# Patient Record
Sex: FEMALE | Race: AMERICAN INDIAN OR ALASKA NATIVE
[De-identification: names, ages, dates, MRNs, and addresses within clinical notes are randomized per-mention and may not be internally consistent; named-entity substitution may affect disease eponyms.]

---

## 2017-01-04 ENCOUNTER — HOSPITAL ENCOUNTER (EMERGENCY)
Dept: HOSPITAL 5 - ED | Age: 67
Discharge: HOME | End: 2017-01-04
Payer: MEDICARE

## 2017-01-04 DIAGNOSIS — R07.9: ICD-10-CM

## 2017-01-04 DIAGNOSIS — N18.6: ICD-10-CM

## 2017-01-04 DIAGNOSIS — Z21: ICD-10-CM

## 2017-01-04 DIAGNOSIS — K21.9: ICD-10-CM

## 2017-01-04 DIAGNOSIS — I12.0: Primary | ICD-10-CM

## 2017-01-04 DIAGNOSIS — Z90.49: ICD-10-CM

## 2017-01-04 DIAGNOSIS — Z99.2: ICD-10-CM

## 2017-01-04 DIAGNOSIS — R79.89: ICD-10-CM

## 2017-01-04 LAB
ANION GAP SERPL CALC-SCNC: 23 MMOL/L
BASOPHILS NFR BLD AUTO: 1 % (ref 0–1.8)
BUN SERPL-MCNC: 43 MG/DL (ref 7–17)
BUN/CREAT SERPL: 6.14 %
CALCIUM SERPL-MCNC: 9.5 MG/DL (ref 8.4–10.2)
CHLORIDE SERPL-SCNC: 94.1 MMOL/L (ref 98–107)
CHOLEST SERPL-MCNC: 146 MG/DL (ref 50–199)
CK SERPL-CCNC: 42 UNITS/L (ref 30–135)
CK SERPL-CCNC: 43 UNITS/L (ref 30–135)
CK SERPL-CCNC: 47 UNITS/L (ref 30–135)
CO2 SERPL-SCNC: 30 MMOL/L (ref 22–30)
EOSINOPHIL NFR BLD AUTO: 1.9 % (ref 0–4.3)
GLUCOSE SERPL-MCNC: 114 MG/DL (ref 65–100)
HCT VFR BLD CALC: 33.4 % (ref 30.3–42.9)
HDLC SERPL-MCNC: 71 MG/DL (ref 40–59)
HGB BLD-MCNC: 10.2 GM/DL (ref 10.1–14.3)
MCH RBC QN AUTO: 27 PG (ref 28–32)
MCHC RBC AUTO-ENTMCNC: 31 % (ref 30–34)
MCV RBC AUTO: 90 FL (ref 79–97)
PLATELET # BLD: 117 K/MM3 (ref 140–440)
POTASSIUM SERPL-SCNC: 4.6 MMOL/L (ref 3.6–5)
RBC # BLD AUTO: 3.73 M/MM3 (ref 3.65–5.03)
SODIUM SERPL-SCNC: 142 MMOL/L (ref 137–145)
TRIGL SERPL-MCNC: 66 MG/DL (ref 2–149)
WBC # BLD AUTO: 4.6 K/MM3 (ref 4.5–11)

## 2017-01-04 PROCEDURE — 78452 HT MUSCLE IMAGE SPECT MULT: CPT

## 2017-01-04 PROCEDURE — 80061 LIPID PANEL: CPT

## 2017-01-04 PROCEDURE — 36415 COLL VENOUS BLD VENIPUNCTURE: CPT

## 2017-01-04 PROCEDURE — 71020: CPT

## 2017-01-04 PROCEDURE — 85025 COMPLETE CBC W/AUTO DIFF WBC: CPT

## 2017-01-04 PROCEDURE — A9502 TC99M TETROFOSMIN: HCPCS

## 2017-01-04 PROCEDURE — 99284 EMERGENCY DEPT VISIT MOD MDM: CPT

## 2017-01-04 PROCEDURE — 96375 TX/PRO/DX INJ NEW DRUG ADDON: CPT

## 2017-01-04 PROCEDURE — 96374 THER/PROPH/DIAG INJ IV PUSH: CPT

## 2017-01-04 PROCEDURE — 93306 TTE W/DOPPLER COMPLETE: CPT

## 2017-01-04 PROCEDURE — 82553 CREATINE MB FRACTION: CPT

## 2017-01-04 PROCEDURE — 83880 ASSAY OF NATRIURETIC PEPTIDE: CPT

## 2017-01-04 PROCEDURE — 84484 ASSAY OF TROPONIN QUANT: CPT

## 2017-01-04 PROCEDURE — 82550 ASSAY OF CK (CPK): CPT

## 2017-01-04 PROCEDURE — 93005 ELECTROCARDIOGRAM TRACING: CPT

## 2017-01-04 PROCEDURE — 93017 CV STRESS TEST TRACING ONLY: CPT

## 2017-01-04 PROCEDURE — 80048 BASIC METABOLIC PNL TOTAL CA: CPT

## 2017-01-04 PROCEDURE — 93010 ELECTROCARDIOGRAM REPORT: CPT

## 2017-01-04 NOTE — ADMIT CRITERIA FORM
Admission Criteria Documentation: 





                                                CHEST PAIN





Clinical Indications for Admission to Inpatient Care





                                                                         (Place 

'X' for any and all applicable criteria): 





Admission is indicated for chest pain and ANY ONE of the following(1)(2)(3)(4)(5

): 





[ ]I.    Angina with acute coronary syndrome (Also use Myocardial Infarction or 

Angina guideline)


[ ]II.   Hemodynamic instability


[ X]III.  Angina needing acute intervention as indicated by ALL of the following

(11)(12):


        [X ]a)  Unstable angina is present as indicated by angina that is ANY 

ONE of the following:


                [ ]i)     New onset   


                [ ]ii)    Nocturnal   


                [ ]iii)   Prolonged at rest   


                [X ]iv)   Progressive


        [X ]b)  Angina warrants acute intervention as indicated by ANY ONE of 

the following:


                [ ]i)     Recurrent angina (e.g, not responding as previously 

to treatment)


                [ ]ii)     Angina at rest or with low-level activities despite 

initial medical therapy


                [ ]iii)    New or presumably new ST-segment depression on ECG


                [ ]iv)    Signs or symptoms of heart failure (eg, dyspnea, 

pulmonary edema)


                [ ]v)     New or worsening mitral regurgitation


                [ ]vi)    Hemodynamic instability


                [ ]vii)   Dangerous arrhythmia (eg, sustained ventricular 

tachycardia)          


                [ ]viii)   History of percutaneous coronary intervention within 

6 months          


                [ ]ix)    History of coronary artery bypass graft surgery


                [ ]x)    BARR[place] risk score of 2 or greater[A]


                [ ]xi)    History of Diabetes(14)


                [ ]xii)   High-risk cardiac ischemia findings on noninvasive 

testing (e.g, echocardiogram,


                          treadmill testing, nuclear scan)


                [X ]xiii)  Chronic renal insufficiency (ie, estimated GFR less 

than 60 mL/min/1.732m)


                [ ]xiv)  Left ventricular ejection fraction less than 40%


              


[ ]IV.   Evidence of MI (eg, cardiac biomarkers positive, ST-segment elevation 

on ECG) also use Myocardial Infarction Criteria Form.


[ ]V.    Pulmonary edema 


[ ]VI.   Respiratory distress


[ ]VII.  Chest pain indicative of serious diagnosis other than coronary artery 

disease (eg, aortic dissection)





[ ]VIII. Contraindications and/or Inappropriate clinical situations for 

Observational Care in patients


          with Chest Pain, when ANY ONE of the following is required:


          [ ]a)   Patient with risk factor for pulmonary embolism, acute 

coronary syndrome and myocardial infarction (18)


          [ ]b)   Patient with Pulmonary embolism require an average LOS of 4.3 

days, therefore emergency 


                   department observation management is inappropriate 18,23


          [ ]c)   Painful condition/s in the elderly, have the highest rate of 

recidivism after emergency department observation management (10.8%)  20,21,22


          [ ]d)   Elevated cardiac biomarker requires intensive and exhaustive 

care (19)


[xX ]IX.  General contraindications and/or Inappropriate clinical situations 

for Observational Care in patients


          with Chest Pain, when ANY ONE of the following is required:


           [xX]a)   Prediction of prolongation of LOS based on ANY ONE of the 

following may be considered as 


                    a contraindication for observational care 2, 3, 4, 5, 6, 7, 

8, 9, 10, 11


                    [X ]i)    Age > 65 yrs.


                    [ ]ii)   Patient arriving by ambulance


                    [ ]iii)  Patient with high acuity


                    [ ]iv)  Patient requiring vital sign monitoring


                    [ ]v)   Patient on IV medication


           [ ]b)   Systolic blood pressures  180mmHg  3,12


           [ ]c)   Patient with altered mental status including delirium and 

other alteration of consciousness, (3)


           [ ]d)   Patient whose discharge disposition will be to a skilled 

nursing home or rehabilitation home should 


                    not be managed in Emergency Department Observation Unit. 

CMS rule requires 3 days hospital stay before such placement. 3,13


           [ ]e)   Patient with failure to thrive due to broad array of 

etiologies  3,16,17


           [ ]f)    Inability to ambulate  3,14








Extended stay beyond goal length of stay may be needed for (1)(28):


[ ]a)   Specific condition diagnosed after evaluation (eg, pulmonary embolism, 

aortic dissection) 


[ ]b)   Unstable angina


[ ]c)   Continued suspicion of acute coronary syndrome with inability to 

complete needed cardiac evaluation


         (eg, patient clinically unable to undergo stress testing)


[ ]d)   Myocardial infarction








(Contents from ANGINA and CHEST PAIN clinical indications for admission to 

inpatient care have been integrated in this form) 








The original [employer] content created by [employer] has been revised. 


The portions of the content which have been revised are identified through the 

use of italic text or in bold, and [facility]


has neither reviewed nor approved the modified material. All other unmodified 

content is copyright  [facility].





Please see references footnoted in the original [facility] edition 

2016





Admission Criteria Met: Yes

## 2017-01-04 NOTE — DISCHARGE SUMMARY
Providers





- Providers


Date of Admission: 


1/4/17





Date of discharge: 01/04/17 01/04/17


Consult to Cardiac Rehabilitation [CONS] Routine 


   Reason For Exam: Phase I





01/04/17 09:28


Consult to Physician [CONS] Routine 


   Consulting Provider: NÉSTOR MORENO


   Reason For Exam: esrd on hd











Primary care physician: 


NANCY CROOKSPOON








Hospitalization


Reason for admission: CHEST PAIN


Condition: Stable


Hospital course: 


Patient is a 66-year-old female with history of cardiomyopathy EF 40-45%, 

hypertension, end-stage renal disease on her way to dialysis this morning and 

experienced some midsternal chest discomfort.  Although in the ER she stated a 

spell of 4 AM she actually tells me that he was on her way to dialysis which 

was much later than that.  She stated that after having about 4 of belching 

exercise the pain went away.  She did report some shortness of breath but she 

states that this has been going on for months.  On his unchanged.  Although 

with the chest pain she was anxious admitted to appear worse.  She denied any 

palpitation she denied any nausea vomiting or diarrhea she denied any 

diaphoresis she denied any blurry vision or headache.  She rated the pain 8/10 

in intensity she stated at first.  Pressure-like with no aggravating factor but 

alleviated by belching.  She had a stress test in 2015 which was unremarkable.  

She unfortunately has not been following up with cardiology as recommended.  

Patient did come in and have a stress test done which was unremarkable echo was 

unchanged.  She was dialyzed and discharge.  She is to follow-up with 

cardiology she verbalizes understanding.





1.  Atypical chest pain- likely GERD


2.  Acute on chronic dyspnea


3.  Hypertension


4.  HIV


5.  Anemia of chronic disease


6.  Chronically elevated troponin








Disposition: DISCHARGED TO HOME OR SELFCARE


Time spent for discharge: 35 MINS





Core Measure Documentation





- Palliative Care


Palliative Care/ Comfort Measures: Not Applicable





- Core Measures


Any of the following diagnoses?: none





- VTE Discharge Requirements


Deep Vein Thrombosis/Pulmonary Embolism Present on Admission: No





Exam





- Physical Exam


Narrative exam: 


 VITAL SIGNS:  Reviewed.    


GENERAL:  The patient appeared well nourished and normally developed. Vital 

signs as documented.


HEAD:  No signs of head trauma.


EYES:  Pupils are equal.  Extraocular motions intact.  


EARS:  Hearing grossly intact.


MOUTH:  Oropharynx is normal. 


NECK:  No adenopathy, no JVD.   


CHEST:  Chest with clear breath sounds bilaterally.  No wheezes, rales, or 

rhonchi.  


CARDIAC:  Regular rate and rhythm.  S1 and S2, without murmurs, gallops, or 

rubs.


VASCULAR:  No Edema.  Peripheral pulses normal and equal in all extremities.


ABDOMEN:  Soft, without detectable tenderness.  No sign of distention.  No   

rebound or guarding, and no masses palpated.   Bowel Sounds normal.


MUSCULOSKELETAL:  Good range of motion of all major joints. Extremities without 

clubbing, cyanosis or edema.  


NEUROLOGIC EXAM:  Alert and oriented x 3.  No focal sensory or strength 

deficits.   Speech normal.  Follows commands.


PSYCHIATRIC:  Mood normal.


SKIN:  No rash or lesions.














- Constitutional


Vitals: 


 











Temp Pulse Resp BP Pulse Ox


 


 98.3 F   102 H  18   161/82   102 H


 


 01/04/17 06:03  01/04/17 09:10  01/04/17 10:43  01/04/17 09:10  01/04/17 09:10














Plan


Activity: advance as tolerated, fall precautions


Diet: renal


Follow up with: 


MARLENE BARON MD [Staff Physician] - 3-5 Days


NANCY VALENCIA MD [Primary Care Provider] - 3-5 Days

## 2017-01-04 NOTE — ECHOCARDIOGRAPHY REPORT
Transthoracic Echocardiogram

 

Indication:

Chest pain 

BP:           161/82

 

Conclusions

 *1. Mild LV dysfunction, EF 40-45%.

 *2. Severe conc LVH.

 *3. Dilated L and R atria.

 *4. Mild-moderate MR.

 *5. Moderate TR with severe pulm HTN.

 *6. Small-moderate pericardial effusion, no tamponade.

 *7. Linear echo density at the root/ascending aorta suggests possible

prior aortic graft-clinical correlation recommended.

 *8. Transaortic gradient of 37/20 suggests mild AS, or may be due to

prior aortic root surgery.

 

Findings     

Left Ventricle:

The left ventricular chamber size is mildly dilated. Severe concentric

left ventricular hypertrophy is observed. Mild global hypokinesis of the

left ventricle is observed. Global left ventricular systolic function is

mild to moderately decreased.  The estimated ejection fraction is

40-45%.  Abnormal left ventricular diastolic function is observed. 

 

Left Atrium:

The left atrium is moderate to severely dilated.  

 

Right Ventricle:

The right ventricle is mildly dilated.  The right ventricular global

systolic function is mildly reduced. 

 

Right Atrium:

The right atrium is moderately dilated.  The interatrial septum appears

normal.  

 

Aortic Valve:

The aortic valve leaflets are mildly thickened. Mild aortic leaflet

calcification is visualized. There is mild aortic regurgitation.  There

is mild aortic stenosis. The mean gradient of the aortic valve is 20

mmHg.  The peak instantaneous gradient of the aortic valve is 37 mmHg. 

The aortic valve area, by VTI's, is calculated at 1.5 cm2.  

 

Mitral Valve:

The mitral valve leaflets appear myxomatous. There is mitral annular

calcification. The mitral valve leaflets are moderately thickened. There

is mild to moderate mitral regurgitation.  There is no evidence of

mitral stenosis. 

 

Tricuspid Valve:

The tricuspid valve leaflets are normal.  There is moderate tricuspid

regurgitation. The right ventricular systolic pressure is calculated at

72 mmHg.  There is evidence of severe pulmonary hypertension. There is

no tricuspid stenosis. 

 

Pulmonic Valve:

The pulmonic valve appears normal. There is trace pulmonic

regurgitation.  There is no pulmonic stenosis.  

 

Pericardium:

There is a small pericardial effusion. There is a circumferential

pericardial effusion. 

 

Aorta:

There is no dilatation of the aortic root. There is a prosthetic graft.

There is a graft in the ascending aorta. 

 

Venous:

The inferior vena cava is dilated.  There is less than 50% respiratory

change in the inferior vena cava dimension. 

 

Measurements     

Chambers MM

Name                    Value         Normal Range            

Ao root diameter (MM)   3.2 cm        (2 - 3.7)               

LA dimension (AP) MM    5.1 cm        (1.9 - 4)               

LA:Ao ratio (MM)        1.59 ratio    -                        

AV cusp separation (MM) 1.4 cm        (1.5 - 2.6)             

 

Chambers 2D

Name                    Value         Normal Range            

RVIDd (AP) 2D           3.47 cm       (0.9 - 2.6)             

IVSd (2D)               1.74 cm       (0.6 - 1.1)             

LVPWd (2D)              1.75 cm       (0.6 - 1.1)             

IVS:LVPW ratio (2D)     0.99 ratio    -                        

LVIDd (2D)              5.32 cm       (3.7 - 5.6)             

LVIDs (2D)              3.61 cm       (2 - 3.8)               

LV FS (Teichholz) (2D)  32.1 %        -                        

LV FS (cube) (2D)       32.1 %        -                        

LA dimension (AP) 2D    4.8 cm        (1.9 - 4)               

 

Volumes/Mass

Name                    Value         Normal Range            

LA ESV SP 4CH (MOD)     87 ml         -                        

LA ESV SP 2CH (MOD)     95 ml         -                        

LA ESV BP (MOD)         96 ml         -                        

LA ESV BP (MOD) index   58.2 ml/m2    -                        

LV EDV SP 4CH (MOD)     54 ml         -                        

LV ESV SP 4CH (MOD)     20 ml         -                        

EF SP 4CH (MOD)         63 %          -                        

LV EDV SP 2CH (MOD)     96 ml         -                        

LV ESV SP 2CH (MOD)     44 ml         -                        

EF SP 2CH (MOD)         54 %          -                        

 

Diastolic/Systolic Function

Name                    Value         Normal Range            

MV E-wave Vmax          1.8 m/sec     -                        

MV deceleration time    106 msec      -                        

LV septal e' Vmax       0.09 m/sec    -                        

LV lateral e' Vmax      0.09 m/sec    -                        

LV E:e' septal ratio    20.3 ratio    -                        

LV E:e' lateral ratio   19.8 ratio    -                        

 

Aortic Valve

Name                    Value         Normal Range            

AV VTI                  49.1 cm       -                        

AV peak gradient        37 mmHg       -                        

AV mean gradient        20 mmHg       -                        

LVOT diameter           2 cm          -                        

LVOT VTI                25.8 cm       -                        

LVOT mean gradient      5 mmHg        -                        

SV LVOT                 81 ml         -                        

MALENA (continuity VTI)    1.5 cm2       -                        

 

Mitral Valve

Name                    Value         Normal Range            

MV PHT                  48 msec       -                        

MR Vmax                 5.21 m/sec    -                        

MR VTI                  152 cm        -                        

MVA (PHT)               4.58 cm2      -                        

 

Tricuspid Valve

Name                    Value         Normal Range            

TR Vmax                 3.77 m/sec    -                        

TR peak gradient        57 mmHg       -                        

RAP                     15 mmHg       -                        

RVSP                    72 mmHg       -                        

 

Pulmonic Valve/Qp:Qs

Name                    Value         Normal Range            

PV Vmax                 1.67 m/sec    -                        

PV peak gradient        11 mmHg       -                        

ID end-diastolic Vmax   1.83 m/sec    -                        

PV acceleration time    102 msec      -                        

 

Electronically signed by: Anamika Garcia MD on 01/04/2017 19:17:08

## 2017-01-04 NOTE — HISTORY AND PHYSICAL REPORT
History of Present Illness


Date of examination: 01/04/17


Date of admission: 





01/04/17


Chief complaint: 


Chest pain or shortness of breath





History of present illness: 


Patient is a 66-year-old female with history of cardiomyopathy EF 40-45%, 

hypertension, end-stage renal disease on her way to dialysis this morning and 

experienced some midsternal chest discomfort.  Although in the ER she stated a 

spell of 4 AM she actually tells me that he was on her way to dialysis which 

was much later than that.  She stated that after having about 4 of belching 

exercise the pain went away.  She did report some shortness of breath but she 

states that this has been going on for months.  On his unchanged.  Although 

with the chest pain she was anxious admitted to appear worse.  She denied any 

palpitation she denied any nausea vomiting or diarrhea she denied any 

diaphoresis she denied any blurry vision or headache.  She rated the pain 8/10 

in intensity she stated at first.  Pressure-like with no aggravating factor but 

I dictated by belching.  She had a stress test in 2015 which was unremarkable.  

She unfortunately has not been following up with cardiology as recommended.





ROS





Constitutional:  No fever, fatigue or weight loss.  


Skin:  No rash.  


Eyes:  No recent vision problems or eye pain.  


ENT:  No congestion, ear pain, or sore throat.  


Endocrine:  No thyroid problems.  


Cardiovascular:  Chest pain.  


Respiratory:  shortness of breath but No cough, congestion, or wheezing.  


Gastrointestinal:  No abdominal pain, nausea, vomiting, or diarrhea.  


Genitourinary: No dysuria.  


Musculoskeletal:  No joint swelling.  


Neurologic:  No seizures. 


Hematologic:  No unusual bruising or bleeding.  


Psychiatric:  No psychiatric problems, hallucinations or depression.  


All other systems reviewed and otherwise negative.








Past History


Past Medical History: arrhythmia, anemia (eyes pulses in all this weekend he), 

CAD, ESRD, HIV/AIDS, hypertension, other (PUD)


Social history: no significant social history, full code


Family history: no significant family history





Medications and Allergies


 Allergies











Allergy/AdvReac Type Severity Reaction Status Date / Time


 


codeine Allergy  HALLUCINATE Verified 10/19/16 11:09


 


hydralazine Allergy  Vomiting Verified 10/19/16 11:09











 Home Medications











 Medication  Instructions  Recorded  Confirmed  Last Taken  Type


 


Promethazine [Phenergan TAB] 25 mg PO Q6H PRN 12/20/13 12/02/15 12/01/15 History


 


lamiVUDine [Epivir] 0.5 tab PO QHS 12/20/13 12/02/15 12/01/15 History


 


Pantoprazole [Protonix TAB] 40 mg PO BID #60 tablet 01/03/14 12/02/15 12/01/15 

Rx


 


Meclizine [Antivert] 25 mg PO TID PRN #20 tablet 07/21/14 12/02/15 12/01/15 Rx


 


Furosemide 80 mg PO DAILY 04/26/15 12/02/15 12/01/15 History


 


Efavirenz [Sustiva] 600 mg PO QHS  capsule 09/19/15 12/02/15 12/01/15 Rx


 


cloNIDine [Catapres] 0.3 mg PO Q6H  tablet 09/19/15 12/02/15 12/01/15 Rx


 


Labetalol [Normodyne TAB] 300 mg PO TID 30 Days 12/04/15  Unknown Rx


 


NIFEdipine XL [Procardia Xl] 60 mg PO BID #60 tablet 12/04/15  Unknown Rx


 


Terazosin [Hytrin] 5 mg PO BID #60 capsule 12/04/15  Unknown Rx











Active Meds: 


Active Medications





Nitroglycerin (Nitrostat)  0.4 mg SL Q5M PRN


   PRN Reason: Chest Pain


Sodium Chloride (Sodium Chloride Flush Syringe 10 Ml)  10 ml IV PRN PRN


   PRN Reason: LINE FLUSH


   Stop: 01/14/17 09:22











Exam





- Physical Exam


Narrative exam: 


 VITAL SIGNS:  Reviewed.    


GENERAL:  The patient appeared well nourished and normally developed. Vital 

signs as documented.


HEAD:  No signs of head trauma.


EYES:  Pupils are equal.  Extraocular motions intact.  


EARS:  Hearing grossly intact.


MOUTH:  Oropharynx is normal. 


NECK:  No adenopathy, no JVD.   


CHEST:  Chest with clear breath sounds bilaterally.  No wheezes, rales, or 

rhonchi.  


CARDIAC:  Regular rate and rhythm.  S1 and S2, without murmurs, gallops, or 

rubs.


VASCULAR:  No Edema.  Peripheral pulses normal and equal in all extremities.


ABDOMEN:  Soft, without detectable tenderness.  No sign of distention.  No   

rebound or guarding, and no masses palpated.   Bowel Sounds normal.


MUSCULOSKELETAL:  Good range of motion of all major joints. Extremities without 

clubbing, cyanosis or edema.  


NEUROLOGIC EXAM:  Alert and oriented x 3.  No focal sensory or strength 

deficits.   Speech normal.  Follows commands.


PSYCHIATRIC:  Mood normal.


SKIN:  No rash or lesions.














- Constitutional


Vitals: 


 











Temp Pulse Resp BP Pulse Ox


 


 98.3 F   102 H  18   161/82   102 H


 


 01/04/17 06:03  01/04/17 09:10  01/04/17 10:43  01/04/17 09:10  01/04/17 09:10














Results





- Labs


CBC & Chem 7: 


 01/04/17 06:18





 01/04/17 06:18


Labs: 


 Laboratory Last Values











WBC  4.6 K/mm3 (4.5-11.0)   01/04/17  06:18    


 


RBC  3.73 M/mm3 (3.65-5.03)   01/04/17  06:18    


 


Hgb  10.2 gm/dl (10.1-14.3)   01/04/17  06:18    


 


Hct  33.4 % (30.3-42.9)   01/04/17  06:18    


 


MCV  90 fl (79-97)   01/04/17  06:18    


 


MCH  27 pg (28-32)  L  01/04/17  06:18    


 


MCHC  31 % (30-34)   01/04/17  06:18    


 


RDW  21.0 % (13.2-15.2)  H  01/04/17  06:18    


 


Plt Count  117 K/mm3 (140-440)  L  01/04/17  06:18    


 


Lymph % (Auto)  17.7 % (13.4-35.0)   01/04/17  06:18    


 


Mono % (Auto)  7.0 % (0.0-7.3)   01/04/17  06:18    


 


Eos % (Auto)  1.9 % (0.0-4.3)   01/04/17  06:18    


 


Baso % (Auto)  1.0 % (0.0-1.8)   01/04/17  06:18    


 


Lymph #  0.8 K/mm3 (1.2-5.4)  L  01/04/17  06:18    


 


Mono #  0.3 K/mm3 (0.0-0.8)   01/04/17  06:18    


 


Eos #  0.1 K/mm3 (0.0-0.4)   01/04/17  06:18    


 


Baso #  0.0 K/mm3 (0.0-0.1)   01/04/17  06:18    


 


Seg Neutrophils %  72.4 % (40.0-70.0)  H  01/04/17  06:18    


 


Seg Neutrophils #  3.4 K/mm3 (1.8-7.7)   01/04/17  06:18    


 


Sodium  142 mmol/L (137-145)   01/04/17  06:18    


 


Potassium  4.6 mmol/L (3.6-5.0)   01/04/17  06:18    


 


Chloride  94.1 mmol/L ()  L  01/04/17  06:18    


 


Carbon Dioxide  30 mmol/L (22-30)   01/04/17  06:18    


 


Anion Gap  23 mmol/L  01/04/17  06:18    


 


BUN  43 mg/dL (7-17)  H  01/04/17  06:18    


 


Creatinine  7.0 mg/dL (0.7-1.2)  H  01/04/17  06:18    


 


Estimated GFR  7 ml/min  01/04/17  06:18    


 


BUN/Creatinine Ratio  6.14 %  01/04/17  06:18    


 


Glucose  114 mg/dL ()  H  01/04/17  06:18    


 


Calcium  9.5 mg/dL (8.4-10.2)   01/04/17  06:18    


 


Total Creatine Kinase  47 units/L ()   01/04/17  09:46    


 


CK-MB (CK-2)  1.8 ng/mL (0.0-4.0)   01/04/17  09:46    


 


CK-MB (CK-2) Rel Index  3.8  (0-4)   01/04/17  09:46    


 


Troponin T  0.074 ng/mL (0.00-0.029)  H  01/04/17  10:33    


 


NT-Pro-B Natriuret Pep  33709 pg/mL (0-900)  H  01/04/17  09:46    


 


Triglycerides  66 mg/dL (2-149)   01/04/17  06:18    


 


Cholesterol  146 mg/dL ()   01/04/17  06:18    


 


LDL Cholesterol Direct  62 mg/dL ()   01/04/17  06:18    


 


HDL Cholesterol  71 mg/dL (40-59)  H  01/04/17  06:18    


 


Cholesterol/HDL Ratio  2.05 %  01/04/17  06:18    














- Imaging and Cardiology


EKG: image reviewed (normal sinus rhythm impression review)


Chest x-ray: image reviewed (cardiomegaly, personal review)





Assessment and Plan


Assessment and plan: 


Patient is a 66-year-old female with history of cardiomyopathy EF 40-45%, 

hypertension, end-stage renal disease on her way to dialysis this morning and 

experienced some midsternal chest discomfort.  Although in the ER she stated a 

spell of 4 AM she actually tells me that he was on her way to dialysis which 

was much later than that.  She stated that after having about 4 of belching 

exercise the pain went away.  She did report some shortness of breath but she 

states that this has been going on for months.  On his unchanged.  Although 

with the chest pain she was anxious admitted to appear worse.  She denied any 

palpitation she denied any nausea vomiting or diarrhea she denied any 

diaphoresis she denied any blurry vision or headache.  She rated the pain 8/10 

in intensity she stated at first.  Pressure-like with no aggravating factor but 

I dictated by belching.  She had a stress test in 2015 which was unremarkable.  

She unfortunately has not been following up with cardiology as recommended.





1.  Atypical chest pain- likely GERD


2.  Acute on chronic dyspnea


3.  Hypertension


4.  HIV


5.  Anemia of chronic disease


6.  Chronically elevated troponin





Plan


* Considering patient's history and risk factors will obtain stress test this 

morning while continuing home medications stress test is negative we'll also 

check an echocardiogram to assure no change in EF.  I've counseled the patient 

will need to follow up with cardiology appropriately if stress test is negative 

patient can be safely discharged home symptoms have resolved.


* Discussed with Dr. Limon cardiologist.


* Nephrology consultation to assist with dialysis


* DVT and GI prophylaxis


Advance Directives: Yes


Plan of care discussed with patient/family: Yes

## 2017-01-04 NOTE — XRAY REPORT
CHEST 2 VIEWS:



INDICATION: Shortness of breath.



COMPARISON: 10/19/2016



FINDINGS: PA and lateral chest radiographs again demonstrate moderate 

cardiomegaly.  Slight aortic knob microcalcifications.  Well-expanded 

lungs, though slight costophrenic angle blunting again not excluded for 

pleural thickening versus minimal fluid.  No large pleural effusions or 

CHF, however.  Possible osteopenia. 



CONCLUSION: No significant interval change in cardiomegaly and few 

other incidental findings, as above.



Thank you for the opportunity to participate in this patient's care.

## 2017-01-04 NOTE — EMERGENCY DEPARTMENT REPORT
HPI





- General


Chief Complaint: Dyspnea/Respdistress


Time Seen by Provider: 01/04/17 08:35





- HPI


HPI: 





The patient is a 66-year-old female with a history of end-stage renal disease, 

who presents for evaluation of chest pain and shortness of breath.  The patient 

reports sudden onset of midsternal chest pain at 4 AM this morning, 4 hours 

prior to my evaluation.  She states that her chest pain has been constant since 

onset, currently 8 out of 10 in severity, pressure-like in quality, and 

associated with moderate to severe constant shortness of breath.  She has a 

secondary complaint of bilateral lower leg swelling which was present prior to 

chest pain and shortness of breath.  The patient denies fever, syncope, 

hemoptysis, unilateral leg swelling, recent immobilization, history of DVT or PE

, hx cancer.





ED Past Medical Hx





- Past Medical History


Hx Hypertension: Yes


Hx Heart Attack/AMI: No


Hx Congestive Heart Failure: Yes


Hx Diabetes: No


Hx GERD: Yes


Hx Liver Disease: Yes (abnormal liver function test, coagulopathy, mass on liver

)


Hx Renal Disease: Yes (DIALYSIS MWF)


Hx Arthritis: No


Hx Asthma: No


Hx COPD: No


Hx HIV: Yes





- Surgical History


Hx Coronary Stent: No


Hx Open Heart Surgery: No


Hx Pacemaker: No


Hx Internal Defibrillator: No


Hx Cholecystectomy: No


Hx Appendectomy: Yes


Hx Breast Surgery: No


Additional Surgical History: PD catheter.  FISTULA LEFT ARM





- Social History


Smoking Status: Never Smoker


Substance Use Type: None





- Medications


Home Medications: 


 Home Medications











 Medication  Instructions  Recorded  Confirmed  Last Taken  Type


 


Promethazine [Phenergan TAB] 25 mg PO Q6H PRN 12/20/13 12/02/15 12/01/15 History


 


lamiVUDine [Epivir] 0.5 tab PO QHS 12/20/13 12/02/15 12/01/15 History


 


Pantoprazole [Protonix TAB] 40 mg PO BID #60 tablet 01/03/14 12/02/15 12/01/15 

Rx


 


Meclizine [Antivert] 25 mg PO TID PRN #20 tablet 07/21/14 12/02/15 12/01/15 Rx


 


Furosemide 80 mg PO DAILY 04/26/15 12/02/15 12/01/15 History


 


Efavirenz [Sustiva] 600 mg PO QHS  capsule 09/19/15 12/02/15 12/01/15 Rx


 


cloNIDine [Catapres] 0.3 mg PO Q6H  tablet 09/19/15 12/02/15 12/01/15 Rx


 


Labetalol [Normodyne TAB] 300 mg PO TID 30 Days 12/04/15  Unknown Rx


 


NIFEdipine XL [Procardia Xl] 60 mg PO BID #60 tablet 12/04/15  Unknown Rx


 


Terazosin [Hytrin] 5 mg PO BID #60 capsule 12/04/15  Unknown Rx














ED Review of Systems


ROS: 


Stated complaint: SOB/CHEST PAIN/SWOLLEN ANKLES


Other details as noted in HPI








Constitutional: denies: fever


ENT: denies: throat or neck pain


Respiratory: denies: cough, report shortness of breath


Cardiovascular: reports chest pain


Endocrine: denies unexplained weight loss or gain


Gastrointestinal: denies: abdominal pain, nausea


Genitourinary: denies: dysuria


Musculoskeletal: reports leg swelling


Skin: denies: rash


Neurological: denies: headache


Hematological/Lymphatic: denies: easy bleeding or easy bruising  


Psych: denies sadness or hopelessness








Physical Exam





- Physical Exam


Vital Signs: 


 Vital Signs











  01/04/17 01/04/17 01/04/17





  06:03 09:05 09:10


 


Temperature 98.3 F  


 


Pulse Rate 112 H  102 H


 


Respiratory 20 16 18





Rate   


 


Blood Pressure 169/88  


 


Blood Pressure   161/82





[Left]   


 


O2 Sat by Pulse 100 97 102 H





Oximetry   











Physical Exam: 








General: well-nourished, well-developed, no acute distress


Head: Normocephalic, atraumatic


Eyes: normal sclera


ENT: Mucous membranes are pink and moist 


Neck: trachea midline, neck supple, No neck stiffness, no cervical adenopathy


Respiratory: Breath sounds equal bilaterally, no wheezing, rales, or rhonchi


Cardio: S1 and S2 present, no murmurs, rubs, gallops, capillary refill is brisk


Abdomen: Normoactive bowel sounds, soft abdomen, no rigidity, no guarding or 

rebound tenderness


Chest WALL/Back: No tenderness to palpation of the chest wall, no CVA 

tenderness with percussion


Musc: 1+ bilateral leg pitting edema


Skin: No rash


Neuro: no facial drooping, normal speech


Psych: Normal affect








ED Course


 Vital Signs











  01/04/17 01/04/17 01/04/17





  06:03 09:05 09:10


 


Temperature 98.3 F  


 


Pulse Rate 112 H  102 H


 


Respiratory 20 16 18





Rate   


 


Blood Pressure 169/88  


 


Blood Pressure   161/82





[Left]   


 


O2 Sat by Pulse 100 97 102 H





Oximetry   














ED Medical Decision Making





- Lab Data


Result diagrams: 


 01/04/17 06:18





 01/04/17 06:18





- Medical Decision Making





The patient was seen and examined by myself.  The patient is placed on a 

cardiac monitor and continuous pulse ox. On initial evaluation, the patient was 

found to be in no distress. Evaluation orders were placed.  EKG was negative 

for ST elevation or other findings suggestive of acute cardiac infarct.  The 

patient is given IV fentanyl for pain.  X-ray of the chest reveals cardio 

megaly and otherwise is grossly unchanged from previous x-ray.  The patient 

given IV Lasix for leg swelling.  Lab results reveal elevated troponin of 0.06, 

and elevated creatinine of 7, and otherwise labs are unrevealing. The on-call 

hospitalist service was contacted.  They agreed to admit the patient for 

further treatment and close monitoring.  The ED admit order was placed.  The 

patient was admitted in guarded condition.





Critical care attestation.: 


If time is entered above; I have spent that time in minutes in the direct care 

of this critically ill patient, excluding procedure time.








ED Disposition


Clinical Impression: 


 Acute chest pain, ESRD (end stage renal disease), Elevated troponin I level


Disposition: OP ADMITTED AS IP TO THIS HOSP


Is pt being admited?: Yes


Does the pt Need Aspirin: Yes


Condition: Stable


Instructions:  Chest Pain (ED)


Referrals: 


NANCY VALENCIA MD [Primary Care Provider] - 3-5 Days


Time of Disposition: 08:58

## 2017-01-05 VITALS — DIASTOLIC BLOOD PRESSURE: 89 MMHG | SYSTOLIC BLOOD PRESSURE: 216 MMHG

## 2017-01-05 NOTE — TREADMILL REPORT
ORDERING PHYSICIAN:  Gianfranco Jeffries M.D.



FINDINGS:  The left ventricular cavity is dilated.  The left ventricular

ejection fraction is measured at 34% with global hypokinesis noted on gated

imaging.  There is no scintigraphic evidence of myocardial ischemia.  There is a

nonspecific decrease in uptake noted in the anterior lateral and inferior wall.



CONCLUSION:

1.  No scintigraphic evidence of myocardial ischemia.

2.  Nonspecific fixed defect noted in the anterior lateral and inferior wall

that is likely due to the technical limitations or artifact.

3.  Dilated and hypokinetic left ventricle with an ejection fraction measuring

34%.





DD: 01/04/2017 12:28

DT: 01/05/2017 00:24

JOB# [id number]  [id number]

FRANCA/BIGG

## 2017-02-02 ENCOUNTER — HOSPITAL ENCOUNTER (EMERGENCY)
Dept: HOSPITAL 5 - ED | Age: 67
Discharge: HOME | End: 2017-02-02
Payer: MEDICARE

## 2017-02-02 VITALS — DIASTOLIC BLOOD PRESSURE: 156 MMHG | SYSTOLIC BLOOD PRESSURE: 240 MMHG

## 2017-02-02 DIAGNOSIS — Z21: ICD-10-CM

## 2017-02-02 DIAGNOSIS — I12.0: Primary | ICD-10-CM

## 2017-02-02 DIAGNOSIS — K21.9: ICD-10-CM

## 2017-02-02 DIAGNOSIS — Z99.2: ICD-10-CM

## 2017-02-02 DIAGNOSIS — R07.9: ICD-10-CM

## 2017-02-02 DIAGNOSIS — N18.6: ICD-10-CM

## 2017-02-02 DIAGNOSIS — Z87.891: ICD-10-CM

## 2017-02-02 DIAGNOSIS — I50.9: ICD-10-CM

## 2017-02-02 LAB
ALBUMIN SERPL-MCNC: 3.6 G/DL (ref 3.9–5)
ALBUMIN/GLOB SERPL: 1 %
ALP SERPL-CCNC: 83 UNITS/L (ref 35–129)
ALT SERPL-CCNC: 7 UNITS/L (ref 7–56)
ANION GAP SERPL CALC-SCNC: 20 MMOL/L
APTT BLD: 37.8 SEC. (ref 24.2–36.6)
BASOPHILS NFR BLD AUTO: 1.2 % (ref 0–1.8)
BILIRUB DIRECT SERPL-MCNC: < 0.2 MG/DL (ref 0–0.2)
BILIRUB INDIRECT SERPL-MCNC: 0.3 MG/DL
BILIRUB SERPL-MCNC: 0.5 MG/DL (ref 0.1–1.2)
BUN SERPL-MCNC: 21 MG/DL (ref 7–17)
BUN/CREAT SERPL: 3.88 %
CALCIUM SERPL-MCNC: 9.2 MG/DL (ref 8.4–10.2)
CHLORIDE SERPL-SCNC: 92.2 MMOL/L (ref 98–107)
CHOLEST SERPL-MCNC: 131 MG/DL (ref 50–199)
CO2 SERPL-SCNC: 35 MMOL/L (ref 22–30)
EOSINOPHIL NFR BLD AUTO: 4.1 % (ref 0–4.3)
GLUCOSE SERPL-MCNC: 101 MG/DL (ref 65–100)
HCT VFR BLD CALC: 30 % (ref 30.3–42.9)
HDLC SERPL-MCNC: 55 MG/DL (ref 40–59)
HGB BLD-MCNC: 9.3 GM/DL (ref 10.1–14.3)
INR PPP: 1.56 (ref 0.87–1.13)
MCH RBC QN AUTO: 26 PG (ref 28–32)
MCHC RBC AUTO-ENTMCNC: 31 % (ref 30–34)
MCV RBC AUTO: 85 FL (ref 79–97)
PLATELET # BLD: 142 K/MM3 (ref 140–440)
POTASSIUM SERPL-SCNC: 3.8 MMOL/L (ref 3.6–5)
PROT SERPL-MCNC: 7.3 G/DL (ref 6.3–8.2)
RBC # BLD AUTO: 3.53 M/MM3 (ref 3.65–5.03)
SODIUM SERPL-SCNC: 143 MMOL/L (ref 137–145)
TRIGL SERPL-MCNC: 80 MG/DL (ref 2–149)
WBC # BLD AUTO: 7.4 K/MM3 (ref 4.5–11)

## 2017-02-02 PROCEDURE — 36415 COLL VENOUS BLD VENIPUNCTURE: CPT

## 2017-02-02 PROCEDURE — 85025 COMPLETE CBC W/AUTO DIFF WBC: CPT

## 2017-02-02 PROCEDURE — 85610 PROTHROMBIN TIME: CPT

## 2017-02-02 PROCEDURE — 80061 LIPID PANEL: CPT

## 2017-02-02 PROCEDURE — 71010: CPT

## 2017-02-02 PROCEDURE — 80048 BASIC METABOLIC PNL TOTAL CA: CPT

## 2017-02-02 PROCEDURE — 80074 ACUTE HEPATITIS PANEL: CPT

## 2017-02-02 PROCEDURE — 96374 THER/PROPH/DIAG INJ IV PUSH: CPT

## 2017-02-02 PROCEDURE — 93005 ELECTROCARDIOGRAM TRACING: CPT

## 2017-02-02 PROCEDURE — 85730 THROMBOPLASTIN TIME PARTIAL: CPT

## 2017-02-02 PROCEDURE — 96376 TX/PRO/DX INJ SAME DRUG ADON: CPT

## 2017-02-02 PROCEDURE — 99285 EMERGENCY DEPT VISIT HI MDM: CPT

## 2017-02-02 PROCEDURE — 84484 ASSAY OF TROPONIN QUANT: CPT

## 2017-02-02 PROCEDURE — 93010 ELECTROCARDIOGRAM REPORT: CPT

## 2017-02-02 PROCEDURE — 96375 TX/PRO/DX INJ NEW DRUG ADDON: CPT

## 2017-02-02 NOTE — ADMIT CRITERIA FORM
Admission Criteria Documentation: 





                                                   HYPERTENSION





Clinical Indications for Admission to Inpatient Care


                                                    


                                                                              (

Place "X" for any and all applicable criteria):                    


                                                                       


Admission is indicated for ANY ONE of the following(1)(2)(3)(4):


[ ]I.   Hypertensive emergency, with evidence of acute and progressing target 

organ disease as indicated 


        by ANY ONE of the following:


        [ ]a)          Hypertensive encephalopathy (eg, confusion, altered 

mental status)


        [ ]b)          Cerebral infarction


        [ ]c)   Intracranial hemorrhage


        [ ]d)   Myocardial ischemia or infarction


        [ ]e)   Pulmonary edema


        [ ]f)   Aortic dissection


        [ ]g)   Seizure


        [ ]h)   Acute renal insufficiency


        [ ]i)   Papilledema


        [ ]j)   Microangiopathic hemolytic anemia


[ ]II.  Adrenergic crisis (eg, severe hypertension due to pheochromocytoma 

crisis, cocaine or amphetamine


        intoxication, or clonidine withdrawal)


[ X]III.  Severe hypertension (SBP greater than 180 mmHg or DBP greater than 

110 mmHg or greater than


        the 95th percentile for age, gender, and height in pediatric patients)  

that cannot be controlled


        (eg, to SBP less than 160 mmHg and DBP less than 100 mmHg in adults) by 

treatment with oral 


        medication in emergency department or  observation care











Extended stay beyond goal length of stay may be needed for(11)(12)(13):


[ ]a)    Persistent hypertensive encephalopathy


[ ]b)    Continuation of pulmonary edema


[ ]c)    Recurring or persistent severe hypertension


[ ]d)    Target organ damage (eg, angina, stroke, aortic dissection)


[ ]e)    Associated renal insufficiency 








The original [employer] content created by [employer] has been revised. 


The portions of the content which have been revised are identified through the 

use of italic text or in bold, and [facility] 


has neither reviewed nor approved the modified material. All other unmodified 

content is copyright  [employer].





Please see references footnoted in the original [employer] edition 

2016

## 2017-02-02 NOTE — EMERGENCY DEPARTMENT REPORT
HPI





- General


Chief Complaint: Chest Pain


Time Seen by Provider: 02/02/17 10:52





- HPI


HPI: 





The patient is a 66-year-old female presents for evaluation of chest pain.  The 

patient reports chest pain since yesterday at noon, nearly 24 hours ago, left-

sided in location, sharp in quality, elicited with coughing, radiating to the 

left axilla, exacerbated with movement of the left chest or left arm, and 

relieved by rest.  She also reports associated worsening of chronic shortness 

of breath, also for the past 2-3 days, increased with exertion, and worsened 

with coughing.  The patient denies fever, syncope, hemoptysis, unilateral leg 

swelling, recent immobilization, history of DVT or PE, hx cancer.





ED Past Medical Hx





- Past Medical History


Hx Hypertension: Yes


Hx Heart Attack/AMI: No


Hx Congestive Heart Failure: Yes


Hx Diabetes: No


Hx GERD: Yes


Hx Liver Disease: Yes (abnormal liver function test, coagulopathy, mass on liver

)


Hx Renal Disease: Yes (DIALYSIS MWF)


Hx Arthritis: No


Hx Asthma: No


Hx COPD: No


Hx HIV: Yes





- Surgical History


Hx Coronary Stent: No


Hx Open Heart Surgery: No


Hx Pacemaker: No


Hx Internal Defibrillator: No


Hx Cholecystectomy: No


Hx Appendectomy: Yes


Hx Breast Surgery: No


Additional Surgical History: PD catheter.  FISTULA LEFT ARM





- Social History


Smoking Status: Former Smoker


Substance Use Type: None





- Medications


Home Medications: 


 Home Medications











 Medication  Instructions  Recorded  Confirmed  Last Taken  Type


 


lamiVUDine [Epivir] 0.5 tab PO QHS 12/20/13 02/02/17 02/02/17 History


 


Meclizine [Antivert] 25 mg PO TID PRN #20 tablet 07/21/14 02/02/17 02/02/17 Rx


 


Efavirenz [Sustiva] 600 mg PO QHS  capsule 09/19/15 02/02/17 02/01/17 Rx


 


NIFEdipine XL [Procardia Xl] 60 mg PO BID #60 tablet 12/04/15 02/02/17 02/02/17 

Rx


 


Aspirin [Aspirin BABY CHEW TAB] 162 mg PO QDAY 02/02/17 02/02/17 02/01/17 

History


 


Cyclobenzaprine HCl [Flexeril 5 MG 5 mg PO Q8HR PRN #10 tab 02/02/17  Unknown Rx





TAB]     


 


Efavirenz [Sustiva] 600 mg PO DAILY 02/02/17 02/02/17 02/01/17 History


 


Labetalol [Normodyne TAB] 200 mg PO BID 02/02/17 02/02/17 02/01/17 History


 


Minoxidil [Loniten] 10 mg PO QDAY 02/02/17 02/02/17 02/01/17 History


 


Multivitamin Tab [Multiple Vitamin 1 each PO QDAY 02/02/17 02/02/17 02/01/17 

History





TAB (Theragran)]     


 


Terazosin [Hytrin] 10 mg PO BID 02/02/17 02/02/17 02/01/17 History


 


cloNIDine [Catapres] 0.2 mg PO QID 02/02/17 02/02/17 02/01/17 History














ED Review of Systems


ROS: 


Stated complaint: MINOR CHEST PAINS/SOB/WEAKNESS


Other details as noted in HPI








Constitutional: denies: fever


ENT: denies: throat or neck pain


Respiratory: reports cough, shortness of breath


Cardiovascular: reports chest pain


Endocrine: denies unexplained weight loss or gain


Gastrointestinal: denies: abdominal pain, nausea


Genitourinary: denies: dysuria


Musculoskeletal: denies: leg swelling


Skin: denies: rash


Neurological: denies: headache


Hematological/Lymphatic: denies: easy bleeding or easy bruising  


Psych: denies sadness or hopelessness








Physical Exam





- Physical Exam


Vital Signs: 


 Vital Signs











  02/02/17 02/02/17





  09:25 10:37


 


Temperature 98.9 F 


 


Pulse Rate 88 76


 


Respiratory 20 19





Rate  


 


Blood Pressure 212/109 


 


O2 Sat by Pulse 94 100





Oximetry  











Physical Exam: 








General: well-nourished, well-developed, no acute distress


Head: Normocephalic, atraumatic


Eyes: normal sclera


ENT: Mucous membranes are pink and moist 


Neck: trachea midline, neck supple, No neck stiffness, no cervical adenopathy


Respiratory: Breath sounds equal bilaterally, no wheezing, rales, or rhonchi


Cardio: S1 and S2 present, no murmurs, rubs, gallops, capillary refill is brisk


Abdomen: Normoactive bowel sounds, soft abdomen, no rigidity, no guarding or 

rebound tenderness


Chest WALL/Back: tenderness to palpation of the left lower chest wall present, 

no CVA tenderness with percussion


Musc: No pitting edema


Skin: No rash


Neuro: no facial drooping, normal speech


Psych: Normal affect








ED Course


 Vital Signs











  02/02/17 02/02/17





  09:25 10:37


 


Temperature 98.9 F 


 


Pulse Rate 88 76


 


Respiratory 20 19





Rate  


 


Blood Pressure 212/109 


 


O2 Sat by Pulse 94 100





Oximetry  














ED Medical Decision Making





- Lab Data


Result diagrams: 


 02/02/17 09:56





 02/02/17 09:56





- Medical Decision Making





The patient was seen and examined by myself.  The patient is placed on a 

cardiac monitor and continuous pulse ox. On initial evaluation, the patient was 

found to be in no distress.  EKG was negative for findings suggestive of acute 

cardiac infarct. Labs and imaging are obtained.  The patient is given a tablet 

of nitroglycerin, and IV fentanyl for pain, and IV labetalol for elevated blood 

pressure. Chest x-ray is negative for pneumothorax, focal consolidation, 

pulmonary vascular congestion, pleural effusion, or other obvious acute 

cardiopulmonary disease process.  Lab results revealed mildly elevated level of 

troponin, .08, and elevated creatinine of 5, consistent with known history of 

end-stage renal disease and chronically mildly elevated troponin, and otherwise 

labs were non-concerning including levels WBC, hemoglobin, hematocrit, 

electrolytes, renal function.





Medical records are reviewed and revealed that the patient received a stress 

test within the past 30 days which was unremarkable. The patient was 

reevaluated and reported that their symptoms were improved, although blood 

pressure remained increased.  The patient is given a second IV dose of 

labetalol. On reexamination the patient's blood pressure was found to decrease 

outside of range concerning for hypertensive emergency. The patient is stable 

for discharge with outpatient follow-up.  The patient is given follow-up and 

return instructions.  The patient expressed understanding and agreed with the 

plan.  The patient is discharged in stable condition.


Critical care attestation.: 


If time is entered above; I have spent that time in minutes in the direct care 

of this critically ill patient, excluding procedure time.








ED Disposition


Clinical Impression: 


 ESRD (end stage renal disease), Hypertensive urgency, Acute chest pain





Disposition: DISCHARGED TO HOME OR SELFCARE


Is pt being admited?: No


Does the pt Need Aspirin: No


Condition: Stable


Instructions:  Chest Pain (ED)


Referrals: 


ADAM CUMMINS MD [Primary Care Provider] - 3-5 Days


Time of Disposition: 11:28

## 2017-02-02 NOTE — XRAY REPORT
PORTABLE CHEST



INDICATION: Chest pain.



COMPARISON: 1/4/2017



FINDINGS: Portable, frontal chest radiograph now demonstrates increased 

interstitial congestive prominence/markings and small possible pleural 

effusions.  Mild to moderate cardiomegaly again noted as also slight 

aortic knob calcifications.  EKG leads.  Stable bones.



CONCLUSION: New mild CHF and stable cardiomegaly, as described.  Please 

correlate.



Thank you for the opportunity to participate in this patient's care.

## 2017-07-17 ENCOUNTER — HOSPITAL ENCOUNTER (INPATIENT)
Dept: HOSPITAL 5 - ED | Age: 67
LOS: 1 days | Discharge: HOME | DRG: 377 | End: 2017-07-18
Attending: INTERNAL MEDICINE | Admitting: INTERNAL MEDICINE
Payer: MEDICARE

## 2017-07-17 DIAGNOSIS — Z99.2: ICD-10-CM

## 2017-07-17 DIAGNOSIS — K21.9: ICD-10-CM

## 2017-07-17 DIAGNOSIS — N18.6: ICD-10-CM

## 2017-07-17 DIAGNOSIS — Z88.8: ICD-10-CM

## 2017-07-17 DIAGNOSIS — I16.0: ICD-10-CM

## 2017-07-17 DIAGNOSIS — D62: ICD-10-CM

## 2017-07-17 DIAGNOSIS — Z88.5: ICD-10-CM

## 2017-07-17 DIAGNOSIS — Z79.82: ICD-10-CM

## 2017-07-17 DIAGNOSIS — K92.2: Primary | ICD-10-CM

## 2017-07-17 DIAGNOSIS — Z82.49: ICD-10-CM

## 2017-07-17 DIAGNOSIS — K64.9: ICD-10-CM

## 2017-07-17 DIAGNOSIS — Z90.49: ICD-10-CM

## 2017-07-17 DIAGNOSIS — I13.2: ICD-10-CM

## 2017-07-17 DIAGNOSIS — B20: ICD-10-CM

## 2017-07-17 DIAGNOSIS — R00.0: ICD-10-CM

## 2017-07-17 LAB
ALBUMIN SERPL-MCNC: 3.8 G/DL (ref 3.9–5)
ALBUMIN/GLOB SERPL: 1.1 %
ALP SERPL-CCNC: 129 UNITS/L (ref 35–129)
ALT SERPL-CCNC: 13 UNITS/L (ref 7–56)
ANION GAP SERPL CALC-SCNC: 18 MMOL/L
APTT BLD: 30.9 SEC. (ref 24.2–36.6)
BASOPHILS NFR BLD AUTO: 1.3 % (ref 0–1.8)
BILIRUB SERPL-MCNC: 0.2 MG/DL (ref 0.1–1.2)
BUN SERPL-MCNC: 19 MG/DL (ref 7–17)
BUN/CREAT SERPL: 4.63 %
CALCIUM SERPL-MCNC: 9.7 MG/DL (ref 8.4–10.2)
CHLORIDE SERPL-SCNC: 91.2 MMOL/L (ref 98–107)
CO2 SERPL-SCNC: 30 MMOL/L (ref 22–30)
EOSINOPHIL NFR BLD AUTO: 1.9 % (ref 0–4.3)
GLUCOSE SERPL-MCNC: 122 MG/DL (ref 65–100)
HCT VFR BLD CALC: 34.6 % (ref 30.3–42.9)
HGB BLD-MCNC: 10.8 GM/DL (ref 10.1–14.3)
INR PPP: 1.23 (ref 0.87–1.13)
LIPASE SERPL-CCNC: 41 UNITS/L (ref 13–60)
MCH RBC QN AUTO: 27 PG (ref 28–32)
MCHC RBC AUTO-ENTMCNC: 31 % (ref 30–34)
MCV RBC AUTO: 86 FL (ref 79–97)
PLATELET # BLD: 120 K/MM3 (ref 140–440)
POTASSIUM SERPL-SCNC: 3.8 MMOL/L (ref 3.6–5)
PROT SERPL-MCNC: 7.4 G/DL (ref 6.3–8.2)
RBC # BLD AUTO: 4 M/MM3 (ref 3.65–5.03)
SODIUM SERPL-SCNC: 135 MMOL/L (ref 137–145)
WBC # BLD AUTO: 4.8 K/MM3 (ref 4.5–11)

## 2017-07-17 PROCEDURE — 86900 BLOOD TYPING SEROLOGIC ABO: CPT

## 2017-07-17 PROCEDURE — 83690 ASSAY OF LIPASE: CPT

## 2017-07-17 PROCEDURE — 85025 COMPLETE CBC W/AUTO DIFF WBC: CPT

## 2017-07-17 PROCEDURE — 82270 OCCULT BLOOD FECES: CPT

## 2017-07-17 PROCEDURE — 93010 ELECTROCARDIOGRAM REPORT: CPT

## 2017-07-17 PROCEDURE — 96361 HYDRATE IV INFUSION ADD-ON: CPT

## 2017-07-17 PROCEDURE — 99291 CRITICAL CARE FIRST HOUR: CPT

## 2017-07-17 PROCEDURE — 85730 THROMBOPLASTIN TIME PARTIAL: CPT

## 2017-07-17 PROCEDURE — 36415 COLL VENOUS BLD VENIPUNCTURE: CPT

## 2017-07-17 PROCEDURE — 80053 COMPREHEN METABOLIC PANEL: CPT

## 2017-07-17 PROCEDURE — 86901 BLOOD TYPING SEROLOGIC RH(D): CPT

## 2017-07-17 PROCEDURE — 85610 PROTHROMBIN TIME: CPT

## 2017-07-17 PROCEDURE — 93005 ELECTROCARDIOGRAM TRACING: CPT

## 2017-07-17 PROCEDURE — 96374 THER/PROPH/DIAG INJ IV PUSH: CPT

## 2017-07-17 PROCEDURE — 86850 RBC ANTIBODY SCREEN: CPT

## 2017-07-17 PROCEDURE — 80048 BASIC METABOLIC PNL TOTAL CA: CPT

## 2017-07-17 RX ADMIN — PRAZOSIN HYDROCHLORIDE SCH MG: 5 CAPSULE ORAL at 23:51

## 2017-07-17 NOTE — HISTORY AND PHYSICAL REPORT
History of Present Illness


Date of examination: 07/17/17


Date of admission: 


07/17/17 20:55





Chief complaint: 





Bright red blood per rectum


History of present illness: 





66-year-old past medical history of constipation for which she used to take 

stool softeners who presents with bright red blood per rectum, Past medical 

history also includes HIV, end-stage renal disease, hypertension.  She states 

that she had been constipated for a few days.  She has a stream and straining 

to get the stool out and after she strained, she felt that she had to either 

break the stool heart rate, push it out.  She felt something, loose when she 

pushed out a bowel movement after which she started having profuse bleeding, 

she had just come home from dialysis when she went to go use the bathroom she 

states that she had lots of bright red blood in the toilet so one small clots.  

The bleeding seems to have stopped but it scared her prompting her to come to 

the hospitalist denies chest dizziness he denies shortness of breath.  Denies 

previous episodes of bleeding per rectum but admits to having constipation and 

admits to having hemorrhoids but has not had this amount of bleeding in the 

past.  She also states that she tends to bleed easily after dialysis today as 

she gets heparin on dialysis day, for example she states she had a dental 

procedure after dialysis and ended up having a lot of bleeding in her gums.





She is also complaining of pain in her lower abdomen which he describes as dull

, 6 out of 10, the pain started right around the same time as the bleeding.





Past History


Past Medical History: dialysis, ESRD (follows with Dr. Pimentel), HIV/AIDS (

follows with Dr. leal), hypertension


Past Surgical History: appendectomy, Other (AV grafts)


Social history: no significant social history


Family history: hypertension, other (MI)





Medications and Allergies


 Allergies











Allergy/AdvReac Type Severity Reaction Status Date / Time


 


codeine Allergy  HALLUCINATE Verified 05/12/17 09:56


 


hydralazine AdvReac  Vomiting Verified 05/12/17 09:56











 Home Medications











 Medication  Instructions  Recorded  Confirmed  Last Taken  Type


 


Aspirin [Aspirin BABY CHEW TAB] 162 mg PO QDAY 02/02/17 07/17/17 05/10/17 

History


 


Efavirenz [Sustiva] 600 mg PO DAILY 02/02/17 07/17/17 05/10/17 History


 


Minoxidil [Loniten] 10 mg PO QDAY 02/02/17 07/17/17 05/10/17 History


 


Terazosin [Hytrin] 10 mg PO BID 02/02/17 07/17/17 05/10/17 History


 


cloNIDine [Catapres] 0.2 mg PO QID 02/02/17 07/17/17 05/10/17 History


 


Sulfamethoxazole/Trimethoprim 0.5 each PO Q24H #30 tablet 04/10/17 07/17/17 05/

10/17 Rx





[Bactrim DS TAB]     


 


Lamivudine 1 tab PO QDAY 07/17/17 07/17/17 Unknown History


 


Tenofovir [Viread] 300 mg PO QWEEK 07/17/17 07/17/17 Unknown History


 


Vit B Cplx #11/FA/C/Biot/Zn Ox 1 tab PO QDAY 07/17/17 07/17/17 Unknown History





[Dialyvite with Zinc Tablet]     











Active Meds: 


Active Medications





Acetaminophen (Tylenol)  650 mg PO Q4H PRN


   PRN Reason: Pain MILD(1-3)/Fever >100.5/HA


Bisacodyl (Dulcolax)  10 mg TN QDAY PRN


   PRN Reason: Constipation unrelieved by MOM


Clonidine HCl (Catapres)  0.2 mg PO QID JEFF


Hydralazine HCl (Apresoline)  10 mg IV Q4HR PRN


   PRN Reason: BP >160/100


Magnesium Hydroxide (Milk Of Magnesia)  30 ml PO Q4H PRN


   PRN Reason: Constipation


Minoxidil (Loniten)  10 mg PO QDAY [facility]


Miscellaneous Medication (Efavirenz [Sustiva])  600 mg PO DAILY [facility]


Miscellaneous Medication (Lamivudine)  1 tab PO QDAY JEFF


Miscellaneous Medication (Terazosin)  10 mg PO BID JEFF


Miscellaneous Medication (Vit B Cplx #11/Fa/C/Biot/Zn Ox [Dialyvite With Zinc 

Tablet])  1 tab PO QDAY JEFF


Morphine Sulfate (Morphine)  2 mg IV Q4H PRN


   PRN Reason: Pain, Moderate (4-6)


Ondansetron HCl (Zofran)  4 mg IV Q8H PRN


   PRN Reason: N/V unrelieved by Reglan


Oxycodone/Acetaminophen (Percocet 5/325)  1 tab PO Q6H PRN


   PRN Reason: Pain, Moderate (4-6)


Tenofovir Disoproxil Fumarate (Viread)  300 mg PO QWEEK JEFF


Trimethoprim/Sulfamethoxazole (Bactrim Ds)  0.5 each PO Q24H JEFF











Review of Systems


All systems: negative (as stated in HPI, 14 point review of systems otherwise 

negative)





Exam





- Constitutional


Vitals: 


 











Temp Pulse Resp BP Pulse Ox


 


 98.9 F   72   20   186/84   95 


 


 07/17/17 22:21  07/17/17 22:21  07/17/17 22:21  07/17/17 22:21  07/17/17 22:21











General appearance: Present: no acute distress, well-nourished





- EENT


Eyes: Present: PERRL


ENT: hearing intact, clear oral mucosa





- Neck


Neck: Present: supple, normal ROM





- Respiratory


Respiratory effort: normal


Respiratory: bilateral: CTA





- Cardiovascular


Heart Sounds: Present: S1 & S2.  Absent: rub, click





- Extremities


Extremities: pulses symmetrical, No edema (AV graft in left upper extremity)


Peripheral Pulses: within normal limits





- Abdominal


General gastrointestinal: Present: soft, non-tender, non-distended, normal 

bowel sounds


Female genitourinary: Present: normal





- Integumentary


Integumentary: Present: clear, warm, dry





- Musculoskeletal


Musculoskeletal: gait normal, strength equal bilaterally





- Psychiatric


Psychiatric: appropriate mood/affect, intact judgment & insight





- Neurologic


Neurologic: CNII-XII intact, moves all extremities





Results





- Labs


CBC & Chem 7: 


 07/17/17 16:49





 07/17/17 16:49


Labs: 


 Laboratory Last Values











WBC  4.8 K/mm3 (4.5-11.0)   07/17/17  16:49    


 


RBC  4.00 M/mm3 (3.65-5.03)   07/17/17  16:49    


 


Hgb  10.8 gm/dl (10.1-14.3)   07/17/17  16:49    


 


Hct  34.6 % (30.3-42.9)   07/17/17  16:49    


 


MCV  86 fl (79-97)   07/17/17  16:49    


 


MCH  27 pg (28-32)  L  07/17/17  16:49    


 


MCHC  31 % (30-34)   07/17/17  16:49    


 


RDW  16.7 % (13.2-15.2)  H  07/17/17  16:49    


 


Plt Count  120 K/mm3 (140-440)  L  07/17/17  16:49    


 


Lymph % (Auto)  19.3 % (13.4-35.0)   07/17/17  16:49    


 


Mono % (Auto)  6.8 % (0.0-7.3)   07/17/17  16:49    


 


Eos % (Auto)  1.9 % (0.0-4.3)   07/17/17  16:49    


 


Baso % (Auto)  1.3 % (0.0-1.8)   07/17/17  16:49    


 


Lymph #  0.9 K/mm3 (1.2-5.4)  L  07/17/17  16:49    


 


Mono #  0.3 K/mm3 (0.0-0.8)   07/17/17  16:49    


 


Eos #  0.1 K/mm3 (0.0-0.4)   07/17/17  16:49    


 


Baso #  0.1 K/mm3 (0.0-0.1)   07/17/17  16:49    


 


Seg Neutrophils %  70.7 % (40.0-70.0)  H  07/17/17  16:49    


 


Seg Neutrophils #  3.4 K/mm3 (1.8-7.7)   07/17/17  16:49    


 


PT  15.4 Sec. (12.2-14.9)  H  07/17/17  16:57    


 


INR  1.23  (0.87-1.13)  H  07/17/17  16:57    


 


APTT  30.9 Sec. (24.2-36.6)   07/17/17  16:57    


 


Sodium  135 mmol/L (137-145)  L  07/17/17  16:49    


 


Potassium  3.8 mmol/L (3.6-5.0)   07/17/17  16:49    


 


Chloride  91.2 mmol/L ()  L  07/17/17  16:49    


 


Carbon Dioxide  30 mmol/L (22-30)   07/17/17  16:49    


 


Anion Gap  18 mmol/L  07/17/17  16:49    


 


BUN  19 mg/dL (7-17)  H  07/17/17  16:49    


 


Creatinine  4.1 mg/dL (0.7-1.2)  H  07/17/17  16:49    


 


Estimated GFR  13 ml/min  07/17/17  16:49    


 


BUN/Creatinine Ratio  4.63 %  07/17/17  16:49    


 


Glucose  122 mg/dL ()  H  07/17/17  16:49    


 


Calcium  9.7 mg/dL (8.4-10.2)   07/17/17  16:49    


 


Total Bilirubin  0.20 mg/dL (0.1-1.2)   07/17/17  16:49    


 


AST  21 units/L (5-40)   07/17/17  16:49    


 


ALT  13 units/L (7-56)   07/17/17  16:49    


 


Alkaline Phosphatase  129 units/L ()   07/17/17  16:49    


 


Total Protein  7.4 g/dL (6.3-8.2)   07/17/17  16:49    


 


Albumin  3.8 g/dL (3.9-5)  L  07/17/17  16:49    


 


Albumin/Globulin Ratio  1.1 %  07/17/17  16:49    


 


Lipase  41 units/L (13-60)   07/17/17  16:49    


 


Blood Type  B POSITIVE   07/17/17  16:49    


 


Antibody Screen  TNR   07/17/17  16:49    


 


ELYSSA Antibody Screen  Negative   07/17/17  16:49    














- Imaging and Cardiology


EKG: image reviewed (normal sinus rhythm at 60 beats per minutes)





Assessment and Plan


Assessment and plan: 





66-year-old has medical history of HIV, end-stage renal disease, hypertension 

who presents to the hospital with bright red blood per rectum, hemoglobin is 

stable at her baseline of 10





Acute blood loss/ GI bleed


Plan liquid diet for now, nothing by mouth after midnight


GI consults, hold all blood thinners, hold aspirin


Given acute abdominal pain, will obtain CT abdomen and pelvis with contrast





End-stage renal disease


Nephrology has been consulted, to continue dialysis on her normal schedule 

Monday Wednesday Friday





Hypertensive urgency


Resume home medications along with IV hydralazine as needed





DVT prophylaxis


SCDs in light of active bleeding


VTE prophylaxis?: Mechanical


Reason for no VTE Prophylaxis: Bleeding


Plan of care discussed with patient/family: Yes

## 2017-07-17 NOTE — ADMIT CRITERIA FORM
Admission Criteria Documentation: 





                GASTROINTESTINAL BLEEDING, LOWER





Clinical Indications for Admission to Inpatient Care





                                                                             (

Place 'X' for any and all applicable criteria):                                

                                





Admission is indicated for  ANY ONE of the following(1)(2)(3)(4)(5):


[X ]I.    Active gross bleeding per rectum 


[ ]II.   Inpatient admission required rather than observation care (Also use  

Gastrointestinal Bleeding, Lower: 


        Observation Care  as appropriate) because of ANY ONE of the  following: 


        [ ]a)   Hemodynamic instability that is severe or persistent


        [ ]b)   Anemia requiring inpatient admission as indicated by ALL of the 

following:


                        [ ]1)  Presence of significant clinical finding 

indicated by ANY ONE of the following:


                                [ ]A.  Tachycardia for age 


                                [ ]B.  Orthostatic vital sign changes 


                                [ ]C.  Cognitive impairment 


                                [ ]D.  Heart failure 


                                [ ]E.  Chest pain 


                                [ ]F.  Exertional dyspnea 


                                [ ]G. Other findings suggesting inadequate 

perfusion (eg, peripheral or myocardial ischemia, 


                                       end organ dysfunction)


                       [ ]2)   Initial (eg, emergency department, observation 

care) treatment with transfusion or volume 


                                replacement is judged inappropriate (due to 

severity of the finding) or has been ineffective


        [ ]c)         Severe pain requiring acute inpatient management 


        [ ]d)         Absent bowel sounds with complete ileus 


        [ ]e)         Signs of intestinal obstruction or peritonitis [A] 


        [ ]f)          High-risk low platelet count 


        [ ]g)         Severe electrolyte abnormalities requiring inpatient care 


        [ ]h)         Acute renal failure 


        [ ]i)          High fever or infection requiring inpatient admission as 

indicated by ANY ONE of the following(8)(9): 


                          [ ]1)   Appropriate outpatient or observation care 

antimicrobial treatment unavailable, not effective, 


                                   or not feasible  Documented bacteremia 


                          [ ]2)   Documented bacteremia


                          [ ]3)   Temperature greater than 104.9 degrees F (

40.5 degrees C) (oral) 


                          [ ]4)   Temperature greater than 103.1 degrees F (

39.5 degrees C) (oral) or less than 96.8 degrees F 


                                   (36 degrees C) (rectal) that does not 

respond to all emergency treatment measures


        [ ]j)            IV fluid to replace significant ongoing losses (

greater than 3 L/m2 per day)


        [ ]k)           Immediate inpatient surgery needed 


        [ ]l)            Parenteral nutrition regimen that must be implemented 

on inpatient basis 


        [ ]m)          Other condition, treatment or monitoring requiring 

inpatient admission


[ ]III.   Unstable comorbid illness (renal, hepatic, pulmonary, hematologic, 

neurologic, or cardiac)


[ ]IV.  Failure to control bleeding after colonoscopy 


[ ]V.   Coagulopathy 


[ ]VI.  Suspected or known ischemic colitis(6) 


[ ]VII.  Previous aortic graft placement or known aortic aneurysm





 





Extended stay beyond goal length of stay may be needed for(3)(4)(28): 


 [ ]a)   Emergency surgery 


 [ ]b)   Coagulation abnormalities(26) 


 [ ]c)   Recurrent or persistent bleeding, continued vital sign instability(27)(

28)


 [ ]d)   Active comorbidities (eg, renal insufficiency, heart failure, pre-

existing liver disease)








The original [employer] content created by [employer] has been revised. 


The portions of the  content which have been revised are identified through the 

use of italic text or in bold, and [facility] 


has neither reviewed  nor  approved the modified material. All other unmodified 

content is copyright  [facility].





Please see references footnoted in the original [employer] edition 

2016





Admission Criteria Met: Yes

## 2017-07-17 NOTE — EMERGENCY DEPARTMENT REPORT
HPI





- General


Chief Complaint: GI Bleed


Time Seen by Provider: 07/17/17 16:38





- HPI


HPI: 





66-year-old -American female presented to ED with bright red blood per 

rectum.  Patient stated she has been obstipated for the past 3 days.  Today had 

a large bowel movement mostly bloody.  She had to wear padding and underwear to 

keep the blood from leaking out.  No fever no chills mild epigastric, no pain 

radiating to left lower quadrant.  Patient does say history of diverticular 

bleed.  Patient is a dialysis patient states compliant with her regimen.





ED Past Medical Hx





- Past Medical History


Hx Hypertension: Yes


Hx Heart Attack/AMI: No


Hx Congestive Heart Failure: Yes


Hx Diabetes: No


Hx GERD: Yes


Hx Liver Disease: Yes (abnormal liver function test, coagulopathy, mass on liver

)


Hx Renal Disease: Yes (DIALYSIS MWF)


Hx Arthritis: No


Hx Asthma: No


Hx COPD: No


Hx HIV: Yes





- Surgical History


Hx Coronary Stent: No


Hx Open Heart Surgery: No


Hx Pacemaker: No


Hx Internal Defibrillator: No


Hx Cholecystectomy: No


Hx Appendectomy: Yes


Hx Breast Surgery: No


Additional Surgical History: PD catheter -removed.  FISTULA LEFT ARM





- Social History


Smoking Status: Never Smoker


Substance Use Type: None





- Medications


Home Medications: 


 Home Medications











 Medication  Instructions  Recorded  Confirmed  Last Taken  Type


 


Aspirin [Aspirin BABY CHEW TAB] 162 mg PO QDAY 02/02/17 07/17/17 05/10/17 

History


 


Efavirenz [Sustiva] 600 mg PO DAILY 02/02/17 07/17/17 05/10/17 History


 


Minoxidil [Loniten] 10 mg PO QDAY 02/02/17 07/17/17 05/10/17 History


 


Terazosin [Hytrin] 10 mg PO BID 02/02/17 07/17/17 05/10/17 History


 


cloNIDine [Catapres] 0.2 mg PO QID 02/02/17 07/17/17 05/10/17 History


 


Sulfamethoxazole/Trimethoprim 0.5 each PO Q24H #30 tablet 04/10/17 07/17/17 05/

10/17 Rx





[Bactrim DS TAB]     


 


Lamivudine 1 tab PO QDAY 07/17/17 07/17/17 Unknown History


 


Tenofovir [Viread] 300 mg PO QWEEK 07/17/17 07/17/17 Unknown History


 


Vit B Cplx #11/FA/C/Biot/Zn Ox 1 tab PO QDAY 07/17/17 07/17/17 Unknown History





[Dialyvite with Zinc Tablet]     














ED Review of Systems


ROS: 


Stated complaint: RECTAL BLEED


Other details as noted in HPI





Comment: All other systems reviewed and negative


Gastrointestinal: abdominal pain, nausea, hematochezia


Neurological: weakness


Psychiatric: anxiety





Physical Exam





- Physical Exam


Physical Exam: 





Vitals reviewed





Gen. alert and oriented 3 in no distress





Head atraumatic normocephalic





Eyes PERR LA EOMI





Chest regular rate and rhythm normal S1-S2 





lungs clear bilaterally





Abdomen soft nondistended





Back no point tenderness paravertebral tenderness





Neuro no focal deficit.





Psych normal mood.





Rectal bright red blood at the vault pain with examination external hemorrhoids 

present that does not appearto be bleeding.





ED Medical Decision Making





- Lab Data


Result diagrams: 


 07/17/17 16:49





 07/17/17 16:49


Critical care attestation.: 


If time is entered above; I have spent that time in minutes in the direct care 

of this critically ill patient, excluding procedure time.








ED Disposition


Clinical Impression: 


 Lower GI bleed





Disposition: DC-[id number] OP ADMIT IP TO THIS HOSP


Is pt being admited?: Yes


Does the pt Need Aspirin: No


Condition: Stable


Referrals: 


PRIMARY CARE,MD [Primary Care Provider] - 3-5 Days


Forms:  Accompanied Note

## 2017-07-18 VITALS — SYSTOLIC BLOOD PRESSURE: 183 MMHG | DIASTOLIC BLOOD PRESSURE: 86 MMHG

## 2017-07-18 LAB
ANION GAP SERPL CALC-SCNC: 22 MMOL/L
BASOPHILS NFR BLD AUTO: 1 % (ref 0–1.8)
BUN SERPL-MCNC: 24 MG/DL (ref 7–17)
BUN/CREAT SERPL: 5 %
CALCIUM SERPL-MCNC: 9.4 MG/DL (ref 8.4–10.2)
CHLORIDE SERPL-SCNC: 96 MMOL/L (ref 98–107)
CO2 SERPL-SCNC: 27 MMOL/L (ref 22–30)
EOSINOPHIL NFR BLD AUTO: 1.9 % (ref 0–4.3)
GLUCOSE SERPL-MCNC: 105 MG/DL (ref 65–100)
HCT VFR BLD CALC: 34 % (ref 30.3–42.9)
HGB BLD-MCNC: 10.7 GM/DL (ref 10.1–14.3)
MCH RBC QN AUTO: 27 PG (ref 28–32)
MCHC RBC AUTO-ENTMCNC: 31 % (ref 30–34)
MCV RBC AUTO: 85 FL (ref 79–97)
PLATELET # BLD: 106 K/MM3 (ref 140–440)
POTASSIUM SERPL-SCNC: 3.9 MMOL/L (ref 3.6–5)
RBC # BLD AUTO: 3.98 M/MM3 (ref 3.65–5.03)
SODIUM SERPL-SCNC: 141 MMOL/L (ref 137–145)
WBC # BLD AUTO: 4.4 K/MM3 (ref 4.5–11)

## 2017-07-18 PROCEDURE — 5A1D00Z: ICD-10-PCS | Performed by: INTERNAL MEDICINE

## 2017-07-18 RX ADMIN — PRAZOSIN HYDROCHLORIDE SCH MG: 5 CAPSULE ORAL at 09:15

## 2017-07-18 NOTE — CONSULTATION
History of Present Illness





- Reason for Consult


Consult date: 07/18/17


end stage renal disease, accelerated hypertension


Requesting physician: VIKTOR REYNA





- History of Present Illness





66-year-old past medical history of constipation for which she used to take 

stool softeners who presents with bright red blood per rectum, Past medical 

history also includes HIV, end-stage renal disease, hypertension.  She states 

that she had been constipated for a few days.  She has a stream and straining 

to get the stool out and after she strained, she felt that she had to either 

break the stool heart rate, push it out.  She felt something, loose when she 

pushed out a bowel movement after which she started having profuse bleeding, 

she had just come home from dialysis when she went to go use the bathroom she 

states that she had lots of bright red blood in the toilet so one small clots.  

The bleeding seems to have stopped but it scared her prompting her to come to 

the hospitalist denies chest dizziness he denies shortness of breath.  Denies 

previous episodes of bleeding per rectum but admits to having constipation and 

admits to having hemorrhoids but has not had this amount of bleeding in the 

past.  She also states that she tends to bleed easily after dialysis today as 

she gets heparin on dialysis day, for example she states she had a dental 

procedure after dialysis and ended up having a lot of bleeding in her gums.





She is also complaining of pain in her lower abdomen which he describes as dull

, 6 out of 10, the pain started right around the same time as the bleeding.








Past History


Past Medical History: dialysis, ESRD (follows with Dr. Pimentel), HIV/AIDS (

follows with Dr. leal), hypertension


Past Surgical History: appendectomy, Other (AV grafts)


Social history: full code.  denies: prescription drug abuse


Family history: hypertension, other (MI)





Medications and Allergies


 Allergies











Allergy/AdvReac Type Severity Reaction Status Date / Time


 


codeine Allergy  HALLUCINATE Verified 05/12/17 09:56


 


hydralazine AdvReac  Vomiting Verified 05/12/17 09:56











 Home Medications











 Medication  Instructions  Recorded  Confirmed  Last Taken  Type


 


Aspirin [Aspirin BABY CHEW TAB] 162 mg PO QDAY 02/02/17 07/17/17 05/10/17 

History


 


Efavirenz [Sustiva] 600 mg PO DAILY 02/02/17 07/17/17 05/10/17 History


 


Minoxidil [Loniten] 10 mg PO QDAY 02/02/17 07/17/17 05/10/17 History


 


Terazosin [Hytrin] 10 mg PO BID 02/02/17 07/17/17 05/10/17 History


 


cloNIDine [Catapres] 0.2 mg PO QID 02/02/17 07/17/17 05/10/17 History


 


Sulfamethoxazole/Trimethoprim 0.5 each PO Q24H #30 tablet 04/10/17 07/17/17 05/

10/17 Rx





[Bactrim DS TAB]     


 


Lamivudine 1 tab PO QDAY 07/17/17 07/17/17 Unknown History


 


Tenofovir [Viread] 300 mg PO QWEEK 07/17/17 07/17/17 Unknown History


 


Vit B Cplx #11/FA/C/Biot/Zn Ox 1 tab PO QDAY 07/17/17 07/17/17 Unknown History





[Dialyvite with Zinc Tablet]     











Active Meds: 


Active Medications





Acetaminophen (Tylenol)  650 mg PO Q4H PRN


   PRN Reason: Pain MILD(1-3)/Fever >100.5/HA


   Last Admin: 07/18/17 01:53 Dose:  650 mg


Bisacodyl (Dulcolax)  10 mg DC QDAY PRN


   PRN Reason: Constipation unrelieved by MOM


Clonidine HCl (Catapres)  0.3 mg PO BID [facility]


Efavirenz (Sustiva)  600 mg PO HS [facility]


   Last Admin: 07/18/17 09:16 Dose:  600 mg


Lamivudine (Epivir)  75 mg PO HS [facility]


Magnesium Hydroxide (Milk Of Magnesia)  30 ml PO Q4H PRN


   PRN Reason: Constipation


Minoxidil (Loniten)  10 mg PO QDAY [facility]


   Last Admin: 07/18/17 09:15 Dose:  10 mg


Multivit/Ca Carb/B Cmplx/FA/Prenat (Renal Caps)  1 cap PO HS [facility]


Nifedipine (Procardia Xl)  30 mg PO Q12HR [facility]


Ondansetron HCl (Zofran)  4 mg IV Q8H PRN


   PRN Reason: N/V unrelieved by Reglan


Prazosin HCl (Minipress)  5 mg PO Q12HR [facility]


   Last Admin: 07/18/17 09:15 Dose:  5 mg


Tenofovir Disoproxil Fumarate (Viread)  300 mg PO Th [facility]


Trimethoprim/Sulfamethoxazole (Bactrim Ds)  0.5 each PO Q24H [facility]


   Last Admin: 07/17/17 23:50 Dose:  0.5 each











Review of Systems


Constitutional: fatigue, weakness, malaise


Gastrointestinal: abdominal pain, change in bowel habits, BRBPR, melena





Exam





- Vital Signs


Vital signs: 


 Vital Signs











Temp Pulse Resp BP Pulse Ox


 


 98.1 F   82   20   166/35   97 


 


 07/17/17 18:59  07/17/17 18:59  07/17/17 18:59  07/17/17 18:59  07/17/17 18:59














- Physical Exam


Narrative exam: 





General appearance: Present: no acute distress, well-nourished





- EENT


Eyes: Present: PERRL


ENT: hearing intact, clear oral mucosa





- Neck


Neck: Present: supple, normal ROM





- Respiratory


Respiratory effort: normal


Respiratory: bilateral: CTA





- Cardiovascular


Heart Sounds: Present: S1 & S2.  Absent: rub, click





- Extremities


Extremities: pulses symmetrical, No edema (AV graft in left upper extremity)


Peripheral Pulses: within normal limits





- Abdominal


General gastrointestinal: Present: soft, non-tender, non-distended, normal 

bowel sounds


Female genitourinary: Present: normal





- Integumentary


Integumentary: Present: clear, warm, dry





- Musculoskeletal


Musculoskeletal: gait normal, strength equal bilaterally





- Psychiatric


Psychiatric: appropriate mood/affect, intact judgment & insight





- Neurologic


Neurologic: CNII-XII intact, moves all extremities








Results





- Lab Results





 07/18/17 03:52





 07/18/17 03:52


 Most recent lab results











Calcium  9.4 mg/dL (8.4-10.2)   07/18/17  03:52    














Assessment and Plan





Impression:


* esrd


* anemia abl/poa


* gi blood loss


* htn


* tachycardia


* HIV





Plan:


* HD q MWF


* prn prbcs, no heparin with hd


* GI input needed


* follow cbc/lytes


* strict i/os


* avoid nephrotoxins


* uf with HD as tolerated


* epogen with HD

## 2017-07-18 NOTE — DISCHARGE SUMMARY
Providers





- Providers


Date of Admission: 


07/17/17 20:55





Attending physician: 


DARREN MOTLEY MD





 





07/17/17 22:33


Consult to Physician [CONS] Routine 


   Consulting Provider: BUZZ FREDERICK


   Reason For Exam: esrd


   Place consult to:: nithya


   Notified:: office


   Time called:: 09:33





07/17/17 22:35


Consult to Physician [CONS] Routine 


   Consulting Provider: DA BETTS


   Reason For Exam: GIB


   Place consult to:: gi


   Notified:: office


   Phone number called:: [phone]


   Was contact made?: Yes


   If yes, spoke with:: ruben


   Time called:: 09:02











Primary care physician: 


PRIMARY CARE MD








Hospitalization


Condition: Stable


Hospital course: 





66-year-old has medical history of HIV, end-stage renal disease, hypertension 

who presents to the hospital with bright red blood per rectum, hemoglobin is 

stable at her baseline of 10.  She did not have any further episodes of 

bleeding while in hospital, the source was most likely thought to be 

hemorrhoidal bleeding because she has a history of hemorrhoids.  Her serial 

hemoglobin were stable.  She was continued on hemodialysis per nephrology.  She 

did have elevated blood pressures differed she received IV blood pressure 

medications and meds were optimized.  She was planned for GI evaluation, the 

patient felt better and wanted to go home, therefore she was advised to follow-

up with gastrology as an outpatient.





Discharge diagnoses


Acute blood loss anemia


GI bleed


End-stage renal disease


Hypertensive urgency








Disposition: DC-01 TO HOME OR SELFCARE


Time spent for discharge: 33 minutes





Core Measure Documentation





- Palliative Care


Palliative Care/ Comfort Measures: Not Applicable





- Core Measures


Any of the following diagnoses?: none





Exam





- Constitutional


Vitals: 


 











Temp Pulse Resp BP Pulse Ox


 


 98.4 F   74   16   183/86   99 


 


 07/18/17 12:02  07/18/17 12:02  07/18/17 12:02  07/18/17 12:02  07/18/17 12:02











General appearance: Present: no acute distress, well-nourished





- EENT


Eyes: Present: PERRL


ENT: hearing intact, clear oral mucosa





- Neck


Neck: Present: supple, normal ROM





- Respiratory


Respiratory effort: normal


Respiratory: bilateral: CTA





- Cardiovascular


Heart Sounds: Present: S1 & S2.  Absent: rub, click





- Extremities


Extremities: pulses symmetrical, No edema


Peripheral Pulses: within normal limits





- Abdominal


General gastrointestinal: Present: soft, non-tender, non-distended, normal 

bowel sounds


Female genitourinary: Present: normal





- Integumentary


Integumentary: Present: clear, warm, dry





- Musculoskeletal


Musculoskeletal: gait normal, strength equal bilaterally





- Psychiatric


Psychiatric: appropriate mood/affect, intact judgment & insight





- Neurologic


Neurologic: CNII-XII intact, moves all extremities





Plan


Follow up with: 


PRIMARY CARE,MD [Primary Care Provider] - 3-5 Days


JESSICA MOREAU MD [Staff Physician] - 7 Days


Forms:  Accompanied Note


Prescriptions: 


Aspirin EC [Aspirin Enteric Coated TAB] 81 mg PO QDAY #30 tablet.


cloNIDine [Catapres] 0.3 mg PO BID #180 tablet


NIFEdipine XL [Procardia Xl] 30 mg PO Q12HR #60 tablet


Sulfamethoxazole/Trimethoprim [Bactrim DS TAB] 0.5 each PO Q24H #30 tablet

## 2017-07-18 NOTE — PROGRESS NOTE
Assessment and Plan


Assessment and plan: 





66-year-old has medical history of HIV, end-stage renal disease, hypertension 

who presents to the hospital with bright red blood per rectum, hemoglobin is 

stable at her baseline of 10





Acute blood loss/ GI bleed


Plan liquid diet for now, nothing by mouth after midnight


GI consults, hold all blood thinners, hold aspirin


Given acute abdominal pain, will obtain CT abdomen and pelvis with contrast





End-stage renal disease


Nephrology has been consulted, to continue dialysis on her normal schedule 

Monday Wednesday Friday





Hypertensive urgency


Resume home medications along with IV hydralazine as needed





DVT prophylaxis


SCDs in light of active bleeding





Hospitalist Physical





- Constitutional


Vitals: 


 











Temp Pulse Resp BP Pulse Ox


 


 98.1 F   80   12   182/84   98 


 


 07/18/17 09:08  07/18/17 09:15  07/18/17 09:08  07/18/17 09:15  07/18/17 09:08











General appearance: Present: no acute distress, well-nourished





Results





- Labs


CBC & Chem 7: 


 07/18/17 03:52





 07/18/17 03:52


Labs: 


 Laboratory Last Values











WBC  4.4 K/mm3 (4.5-11.0)  L  07/18/17  03:52    


 


RBC  3.98 M/mm3 (3.65-5.03)   07/18/17  03:52    


 


Hgb  10.7 gm/dl (10.1-14.3)   07/18/17  03:52    


 


Hct  34.0 % (30.3-42.9)   07/18/17  03:52    


 


MCV  85 fl (79-97)   07/18/17  03:52    


 


MCH  27 pg (28-32)  L  07/18/17  03:52    


 


MCHC  31 % (30-34)   07/18/17  03:52    


 


RDW  16.8 % (13.2-15.2)  H  07/18/17  03:52    


 


Plt Count  106 K/mm3 (140-440)  L  07/18/17  03:52    


 


Lymph % (Auto)  25.7 % (13.4-35.0)   07/18/17  03:52    


 


Mono % (Auto)  8.2 % (0.0-7.3)  H  07/18/17  03:52    


 


Eos % (Auto)  1.9 % (0.0-4.3)   07/18/17  03:52    


 


Baso % (Auto)  1.0 % (0.0-1.8)   07/18/17  03:52    


 


Lymph #  1.1 K/mm3 (1.2-5.4)  L  07/18/17  03:52    


 


Mono #  0.4 K/mm3 (0.0-0.8)   07/18/17  03:52    


 


Eos #  0.1 K/mm3 (0.0-0.4)   07/18/17  03:52    


 


Baso #  0.0 K/mm3 (0.0-0.1)   07/18/17  03:52    


 


Seg Neutrophils %  63.2 % (40.0-70.0)   07/18/17  03:52    


 


Seg Neutrophils #  2.8 K/mm3 (1.8-7.7)   07/18/17  03:52    


 


PT  15.4 Sec. (12.2-14.9)  H  07/17/17  16:57    


 


INR  1.23  (0.87-1.13)  H  07/17/17  16:57    


 


APTT  30.9 Sec. (24.2-36.6)   07/17/17  16:57    


 


Sodium  141 mmol/L (137-145)   07/18/17  03:52    


 


Potassium  3.9 mmol/L (3.6-5.0)   07/18/17  03:52    


 


Chloride  96.0 mmol/L ()  L  07/18/17  03:52    


 


Carbon Dioxide  27 mmol/L (22-30)   07/18/17  03:52    


 


Anion Gap  22 mmol/L  07/18/17  03:52    


 


BUN  24 mg/dL (7-17)  H  07/18/17  03:52    


 


Creatinine  4.8 mg/dL (0.7-1.2)  H  07/18/17  03:52    


 


Estimated GFR  11 ml/min  07/18/17  03:52    


 


BUN/Creatinine Ratio  5.00 %  07/18/17  03:52    


 


Glucose  105 mg/dL ()  H  07/18/17  03:52    


 


Calcium  9.4 mg/dL (8.4-10.2)   07/18/17  03:52    


 


Total Bilirubin  0.20 mg/dL (0.1-1.2)   07/17/17  16:49    


 


AST  21 units/L (5-40)   07/17/17  16:49    


 


ALT  13 units/L (7-56)   07/17/17  16:49    


 


Alkaline Phosphatase  129 units/L ()   07/17/17  16:49    


 


Total Protein  7.4 g/dL (6.3-8.2)   07/17/17  16:49    


 


Albumin  3.8 g/dL (3.9-5)  L  07/17/17  16:49    


 


Albumin/Globulin Ratio  1.1 %  07/17/17  16:49    


 


Lipase  41 units/L (13-60)   07/17/17  16:49    


 


Blood Type  B POSITIVE   07/17/17  16:49    


 


Antibody Screen  TNR   07/17/17  16:49    


 


ELYSSA Antibody Screen  Negative   07/17/17  16:49

## 2018-06-13 ENCOUNTER — HOSPITAL ENCOUNTER (EMERGENCY)
Dept: HOSPITAL 5 - ED | Age: 68
Discharge: HOME | End: 2018-06-13
Payer: MEDICARE

## 2018-06-13 VITALS — SYSTOLIC BLOOD PRESSURE: 182 MMHG | DIASTOLIC BLOOD PRESSURE: 76 MMHG

## 2018-06-13 DIAGNOSIS — N18.6: ICD-10-CM

## 2018-06-13 DIAGNOSIS — Z79.82: ICD-10-CM

## 2018-06-13 DIAGNOSIS — I12.0: ICD-10-CM

## 2018-06-13 DIAGNOSIS — Z99.2: ICD-10-CM

## 2018-06-13 DIAGNOSIS — J01.90: Primary | ICD-10-CM

## 2018-06-13 DIAGNOSIS — K21.9: ICD-10-CM

## 2018-06-13 DIAGNOSIS — Z90.49: ICD-10-CM

## 2018-06-13 DIAGNOSIS — I50.9: ICD-10-CM

## 2018-06-13 DIAGNOSIS — J06.9: ICD-10-CM

## 2018-06-13 PROCEDURE — 99282 EMERGENCY DEPT VISIT SF MDM: CPT

## 2018-06-13 NOTE — EMERGENCY DEPARTMENT REPORT
ED General Adult HPI





- General


Chief complaint: Sore Throat


Stated complaint: SORE THROAT


Time Seen by Provider: 06/13/18 11:53


Source: patient


Mode of arrival: Ambulatory


Limitations: No Limitations





- History of Present Illness


Initial comments: 





Patient is a 67-year-old  female past medical history of end-stage renal 

disease who is presenting with cough congestion sore throat and facial pain.  

Patient states symptoms started some sinus discomfort approximately week ago.  

Patient states that she has some soreness in the forehead and above the nose 

now she has a cough productive of clear to white sputum.  Patient states she 

has some soreness in her throat.  Patient states as a watery.  Patient denies 

any fevers.  Patient's does have a history of hypertension end-stage renal 

disease and just came from dialysis.





- Related Data


 Home Medications











 Medication  Instructions  Recorded  Confirmed  Last Taken


 


Efavirenz [Sustiva] 600 mg PO DAILY 02/02/17 07/17/17 05/10/17


 


Minoxidil [Loniten] 10 mg PO QDAY 02/02/17 07/17/17 05/10/17


 


Terazosin [Hytrin] 10 mg PO BID 02/02/17 07/17/17 05/10/17


 


B Complex 11/Folic/C/Biot/Zinc 1 tab PO QDAY 07/17/17 07/17/17 Unknown





[Dialyvite with Zinc Tablet]    


 


Lamivudine 1 tab PO QDAY 07/17/17 07/17/17 Unknown


 


Tenofovir [Viread] 300 mg PO QWEEK 07/17/17 07/17/17 Unknown








 Previous Rx's











 Medication  Instructions  Recorded  Last Taken  Type


 


Aspirin EC [Aspirin Enteric Coated 81 mg PO QDAY #30 tablet. 07/18/17 Unknown 

Rx





TAB]    


 


NIFEdipine XL [Procardia Xl] 30 mg PO Q12HR #60 tablet 07/18/17 Unknown Rx


 


Sulfamethoxazole/Trimethoprim 0.5 each PO Q24H #30 tablet 07/18/17 Unknown Rx





[Bactrim DS TAB]    


 


cloNIDine [Catapres] 0.3 mg PO BID #180 tablet 07/18/17 Unknown Rx


 


ALBUTEROL Inhaler [ProAir HFA 2 puff IH QID PRN #1 inhalation 06/13/18 Unknown 

Rx





Inhaler]    


 


Fluticasone [Flonase] 1 spray NS QDAY #1 bottle 06/13/18 Unknown Rx


 


Nitrofurantoin Monohyd/M-Cryst 100 mg PO BID #14 capsule 06/13/18 Unknown Rx





[Macrobid 100 mg Capsule]    


 


predniSONE [Deltasone] 20 mg PO QDAY #5 tab 06/13/18 Unknown Rx











 Allergies











Allergy/AdvReac Type Severity Reaction Status Date / Time


 


codeine Allergy  HALLUCINATE Verified 05/12/17 09:56


 


hydralazine AdvReac  Vomiting Verified 05/12/17 09:56














ED Review of Systems


ROS: 


Stated complaint: SORE THROAT


Other details as noted in HPI





Comment: All other systems reviewed and negative





ED Past Medical Hx





- Past Medical History


Hx Hypertension: Yes


Hx Heart Attack/AMI: No


Hx Congestive Heart Failure: Yes


Hx Diabetes: No


Hx GERD: Yes


Hx Liver Disease: Yes (abnormal liver function test, coagulopathy, mass on liver

)


Hx Renal Disease: Yes (DIALYSIS MWF)


Hx Arthritis: No


Hx Asthma: No


Hx COPD: No


Hx HIV: Yes





- Surgical History


Hx Coronary Stent: No


Hx Open Heart Surgery: No


Hx Pacemaker: No


Hx Internal Defibrillator: No


Hx Cholecystectomy: No


Hx Appendectomy: Yes


Hx Breast Surgery: No


Additional Surgical History: PD catheter -removed.  FISTULA LEFT ARM





- Social History


Smoking Status: Never Smoker


Substance Use Type: None





- Medications


Home Medications: 


 Home Medications











 Medication  Instructions  Recorded  Confirmed  Last Taken  Type


 


Efavirenz [Sustiva] 600 mg PO DAILY 02/02/17 07/17/17 05/10/17 History


 


Minoxidil [Loniten] 10 mg PO QDAY 02/02/17 07/17/17 05/10/17 History


 


Terazosin [Hytrin] 10 mg PO BID 02/02/17 07/17/17 05/10/17 History


 


B Complex 11/Folic/C/Biot/Zinc 1 tab PO QDAY 07/17/17 07/17/17 Unknown History





[Dialyvite with Zinc Tablet]     


 


Lamivudine 1 tab PO QDAY 07/17/17 07/17/17 Unknown History


 


Tenofovir [Viread] 300 mg PO QWEEK 07/17/17 07/17/17 Unknown History


 


Aspirin EC [Aspirin Enteric Coated 81 mg PO QDAY #30 tablet. 07/18/17  

Unknown Rx





TAB]     


 


NIFEdipine XL [Procardia Xl] 30 mg PO Q12HR #60 tablet 07/18/17  Unknown Rx


 


Sulfamethoxazole/Trimethoprim 0.5 each PO Q24H #30 tablet 07/18/17  Unknown Rx





[Bactrim DS TAB]     


 


cloNIDine [Catapres] 0.3 mg PO BID #180 tablet 07/18/17  Unknown Rx


 


ALBUTEROL Inhaler [ProAir HFA 2 puff IH QID PRN #1 inhalation 06/13/18  Unknown 

Rx





Inhaler]     


 


Fluticasone [Flonase] 1 spray NS QDAY #1 bottle 06/13/18  Unknown Rx


 


Nitrofurantoin Monohyd/M-Cryst 100 mg PO BID #14 capsule 06/13/18  Unknown Rx





[Macrobid 100 mg Capsule]     


 


predniSONE [Deltasone] 20 mg PO QDAY #5 tab 06/13/18  Unknown Rx














ED Physical Exam





- General


Limitations: No Limitations


General appearance: alert, in no apparent distress





- Head


Head exam: Present: atraumatic, normocephalic, other (patient has generalized 

sinus tenderness)





- Eye


Eye exam: Present: normal appearance





- ENT


ENT exam: Present: mucous membranes moist, TM's normal bilaterally, normal 

external ear exam





- Neck


Neck exam: Present: normal inspection





- Respiratory


Respiratory exam: Present: normal lung sounds bilaterally.  Absent: respiratory 

distress, wheezes, rales, rhonchi





- Cardiovascular


Cardiovascular Exam: Present: regular rate, normal rhythm.  Absent: systolic 

murmur, diastolic murmur, rubs, gallop





- GI/Abdominal


GI/Abdominal exam: Present: soft, normal bowel sounds.  Absent: distended, 

tenderness, guarding, rebound





- Extremities Exam


Extremities exam: Present: normal inspection





- Back Exam


Back exam: Present: normal inspection





- Neurological Exam


Neurological exam: Present: alert, oriented X3





- Psychiatric


Psychiatric exam: Present: normal affect, normal mood





- Skin


Skin exam: Present: warm, dry, intact, normal color.  Absent: rash





ED Course





 Vital Signs











  06/13/18





  10:44


 


Temperature 98.5 F


 


Pulse Rate 69


 


Respiratory 16





Rate 


 


Blood Pressure 182/76


 


O2 Sat by Pulse 97





Oximetry 














ED Medical Decision Making





- Medical Decision Making





Patient clinically has a sinus infection and will be treated


Critical care attestation.: 


If time is entered above; I have spent that time in minutes in the direct care 

of this critically ill patient, excluding procedure time.








ED Disposition


Clinical Impression: 


 Acute sinusitis, Upper respiratory infection, ESRD (end stage renal disease)





Disposition: DC-01 TO HOME OR SELFCARE


Is pt being admited?: No


Does the pt Need Aspirin: No


Condition: Stable


Instructions:  Sinusitis (ED)


Prescriptions: 


ALBUTEROL Inhaler [ProAir HFA Inhaler] 2 puff IH QID PRN #1 inhalation


 PRN Reason: Shortness Of Breath


Fluticasone [Flonase] 1 spray NS QDAY #1 bottle


Nitrofurantoin Monohyd/M-Cryst [Macrobid 100 mg Capsule] 100 mg PO BID #14 

capsule


predniSONE [Deltasone] 20 mg PO QDAY #5 tab


Referrals: 


PRIMARY CARE,MD [Primary Care Provider] - 3-5 Days

## 2018-07-05 ENCOUNTER — HOSPITAL ENCOUNTER (INPATIENT)
Dept: HOSPITAL 5 - ED | Age: 68
LOS: 2 days | Discharge: HOME | DRG: 377 | End: 2018-07-07
Attending: INTERNAL MEDICINE | Admitting: INTERNAL MEDICINE
Payer: MEDICARE

## 2018-07-05 DIAGNOSIS — K62.5: Primary | ICD-10-CM

## 2018-07-05 DIAGNOSIS — D62: ICD-10-CM

## 2018-07-05 DIAGNOSIS — Z99.2: ICD-10-CM

## 2018-07-05 DIAGNOSIS — I13.2: ICD-10-CM

## 2018-07-05 DIAGNOSIS — B20: ICD-10-CM

## 2018-07-05 DIAGNOSIS — Z88.6: ICD-10-CM

## 2018-07-05 DIAGNOSIS — Z88.8: ICD-10-CM

## 2018-07-05 DIAGNOSIS — N18.6: ICD-10-CM

## 2018-07-05 DIAGNOSIS — I50.9: ICD-10-CM

## 2018-07-05 DIAGNOSIS — K21.9: ICD-10-CM

## 2018-07-05 LAB
ALBUMIN SERPL-MCNC: 3 G/DL (ref 3.9–5)
ALT SERPL-CCNC: 5 UNITS/L (ref 7–56)
BASOPHILS # (AUTO): 0.1 K/MM3 (ref 0–0.1)
BASOPHILS NFR BLD AUTO: 1.4 % (ref 0–1.8)
BUN SERPL-MCNC: 26 MG/DL (ref 7–17)
BUN/CREAT SERPL: 5 %
CALCIUM SERPL-MCNC: 8.3 MG/DL (ref 8.4–10.2)
EOSINOPHIL # BLD AUTO: 0.2 K/MM3 (ref 0–0.4)
EOSINOPHIL NFR BLD AUTO: 2.4 % (ref 0–4.3)
HCT VFR BLD CALC: 20.6 % (ref 30.3–42.9)
HEMOLYSIS INDEX: 0
HGB BLD-MCNC: 6.3 GM/DL (ref 10.1–14.3)
LYMPHOCYTES # BLD AUTO: 1.2 K/MM3 (ref 1.2–5.4)
LYMPHOCYTES NFR BLD AUTO: 14.8 % (ref 13.4–35)
MCH RBC QN AUTO: 27 PG (ref 28–32)
MCHC RBC AUTO-ENTMCNC: 31 % (ref 30–34)
MCV RBC AUTO: 87 FL (ref 79–97)
MONOCYTES # (AUTO): 0.5 K/MM3 (ref 0–0.8)
MONOCYTES % (AUTO): 5.6 % (ref 0–7.3)
PLATELET # BLD: 177 K/MM3 (ref 140–440)
RBC # BLD AUTO: 2.35 M/MM3 (ref 3.65–5.03)

## 2018-07-05 PROCEDURE — 80053 COMPREHEN METABOLIC PANEL: CPT

## 2018-07-05 PROCEDURE — 85014 HEMATOCRIT: CPT

## 2018-07-05 PROCEDURE — 74019 RADEX ABDOMEN 2 VIEWS: CPT

## 2018-07-05 PROCEDURE — 86900 BLOOD TYPING SEROLOGIC ABO: CPT

## 2018-07-05 PROCEDURE — 36415 COLL VENOUS BLD VENIPUNCTURE: CPT

## 2018-07-05 PROCEDURE — 86850 RBC ANTIBODY SCREEN: CPT

## 2018-07-05 PROCEDURE — C9113 INJ PANTOPRAZOLE SODIUM, VIA: HCPCS

## 2018-07-05 PROCEDURE — 85610 PROTHROMBIN TIME: CPT

## 2018-07-05 PROCEDURE — 36430 TRANSFUSION BLD/BLD COMPNT: CPT

## 2018-07-05 PROCEDURE — 85025 COMPLETE CBC W/AUTO DIFF WBC: CPT

## 2018-07-05 PROCEDURE — 86920 COMPATIBILITY TEST SPIN: CPT

## 2018-07-05 PROCEDURE — 74176 CT ABD & PELVIS W/O CONTRAST: CPT

## 2018-07-05 PROCEDURE — 86901 BLOOD TYPING SEROLOGIC RH(D): CPT

## 2018-07-05 PROCEDURE — 85018 HEMOGLOBIN: CPT

## 2018-07-05 PROCEDURE — P9016 RBC LEUKOCYTES REDUCED: HCPCS

## 2018-07-05 RX ADMIN — PANTOPRAZOLE SODIUM SCH MG: 40 INJECTION, POWDER, FOR SOLUTION INTRAVENOUS at 23:53

## 2018-07-05 NOTE — XRAY REPORT
FINAL REPORT



PROCEDURE:  XR ABDOMEN 2V



TECHNIQUE:  Abdominal series, including supine and upright AP

views. 



HISTORY:  Abd pain 







COMPARISON:  No prior studies are available for comparison.



FINDINGS:  

Bowel gas pattern:Intestinal gas is distributed predominantly in

nondistended colon and rectum. Moderate degree residual stool is

noted..



Masses or calcifications:None  .



Bony structures:No significant abnormality  .



Pneumoperitoneum:None .



Other:Moderate degree cardiomegaly is noted..



IMPRESSION:  

Nonspecific intestinal gas pattern.



Moderate degree residual stool



Moderate cardiomegaly.

## 2018-07-05 NOTE — EMERGENCY DEPARTMENT REPORT
HPI





- General


Chief Complaint: Recheck/Abnormal Lab/Rx


Time Seen by Provider: 07/05/18 18:57





- HPI


HPI: 


67-year-old -American female presents to the emergency department, sent 

in by her nephrologist, after she was found to have low hemoglobin of 6.9 from 

blood work that was done last Monday at dialysis.  Patient says that she's been 

feeling increased fatigue and generalized weakness.  She also has been having a 

24-hour history of some right-sided abdominal pain.  Currently it is 8 out of 

10 in intensity.  She says that she has had this in the past and has had a 

workup including CT scans, EGD and colonoscopy and was told that it was 

"inflammation of my stomach."  Recently she's been having some blood in the 

stool that is bright red but no difficulty with going.  She has a history of end

-stage renal disease on hemodialysis on Monday/Wednesday/Friday, CHF, HIV, 

hypertension.  She has left upper extremity dialysis access.  Her nephrologist 

is Dr. Pimentel who also functions as her primary care physician.  No recent 

travel or sick contacts at home.  She has not taken anything for her symptoms 

prior presentation.








ED Past Medical Hx





- Past Medical History


Hx Hypertension: Yes


Hx Heart Attack/AMI: No


Hx Congestive Heart Failure: Yes


Hx Diabetes: No


Hx GERD: Yes


Hx Liver Disease: Yes (abnormal liver function test, coagulopathy, mass on liver

)


Hx Renal Disease: Yes (DIALYSIS MWF)


Hx Arthritis: No


Hx Asthma: No


Hx COPD: No


Hx HIV: Yes





- Surgical History


Hx Coronary Stent: No


Hx Open Heart Surgery: No


Hx Pacemaker: No


Hx Internal Defibrillator: No


Hx Cholecystectomy: No


Hx Appendectomy: Yes


Hx Breast Surgery: No


Additional Surgical History: PD catheter -removed.  FISTULA LEFT ARM





- Social History


Smoking Status: Never Smoker


Substance Use Type: None





- Medications


Home Medications: 


 Home Medications











 Medication  Instructions  Recorded  Confirmed  Last Taken  Type


 


Efavirenz [Sustiva] 600 mg PO DAILY 02/02/17 07/05/18 05/10/17 History


 


Minoxidil [Loniten] 10 mg PO QDAY 02/02/17 07/05/18 05/10/17 History


 


Terazosin [Hytrin] 10 mg PO BID 02/02/17 07/05/18 05/10/17 History


 


Lamivudine 1 tab PO QDAY 07/17/17 07/05/18 Unknown History


 


Tenofovir [Viread] 300 mg PO QWEEK 07/17/17 07/05/18 Unknown History


 


NIFEdipine XL [Procardia Xl] 30 mg PO Q12HR #60 tablet 07/18/17 07/05/18 

Unknown Rx


 


cloNIDine [Catapres] 0.3 mg PO BID #180 tablet 07/18/17 07/05/18 Unknown Rx


 


ALBUTEROL Inhaler [ProAir HFA 2 puff IH QID PRN #1 inhalation 06/13/18 07/05/18 

Unknown Rx





Inhaler]     


 


Fluticasone [Flonase] 1 spray NS QDAY #1 bottle 06/13/18 07/05/18 Unknown Rx














ED Review of Systems


ROS: 


Stated complaint: ABNORMAL LABS


Other details as noted in HPI





Comment: All other systems reviewed and negative


Constitutional: weakness.  denies: chills, fever


Eyes: denies: eye pain, eye discharge, vision change


ENT: denies: ear pain, throat pain


Respiratory: denies: cough, shortness of breath, wheezing


Cardiovascular: denies: chest pain, palpitations


Gastrointestinal: abdominal pain, other (BRBPR).  denies: melena


Genitourinary: denies: urgency, dysuria, discharge


Musculoskeletal: denies: back pain, joint swelling, arthralgia


Skin: denies: rash, lesions


Neurological: denies: headache, weakness, paresthesias





Physical Exam





- Physical Exam


Vital Signs: 


 Vital Signs











  07/05/18 07/05/18 07/05/18





  17:25 18:50 18:51


 


Temperature 99.2 F  


 


Pulse Rate 74 65 64


 


Respiratory 22 14 16





Rate   


 


Blood Pressure 135/41  


 


Blood Pressure   152/55





[Right]   


 


O2 Sat by Pulse 97 100 99





Oximetry   














  07/05/18 07/05/18





  18:58 19:00


 


Temperature  


 


Pulse Rate  61


 


Respiratory 16 15





Rate  


 


Blood Pressure  


 


Blood Pressure  





[Right]  


 


O2 Sat by Pulse 99 100





Oximetry  











Physical Exam: 





GENERAL: The patient is well-developed well-nourished.


HENT: Normocephalic.  Atraumatic.    Patient has moist mucous membranes.


EYES: Extraocular motions are intact.  Pupils equal reactive to light 

bilaterally.


NECK: Supple.  Trachea is midline.


CHEST/LUNGS: Clear to auscultation.  There is no respiratory distress noted.


HEART/CARDIOVASCULAR: Regular.  There is no tachycardia.  There is no murmur.


ABDOMEN: Abdomen is soft.  There is some right-sided abdominal tenderness to 

palpation, mostly in the right lower quadrant.  Patient has normal bowel 

sounds.  There is no abdominal distention.


SKIN: Skin is warm and dry.


NEURO: The patient is awake, alert, and oriented.  The patient is cooperative.  

The patient has no focal neurologic deficits.  The patient has normal speech.


MUSCULOSKELETAL: There is no tenderness or deformity.  There is no limitation 

range of motion.  There is no evidence of acute injury.


RECTAL: No gross blood.  There is brown stool that is positive on guaiac 

testing.





ED Course


 Vital Signs











  07/05/18 07/05/18 07/05/18





  17:25 18:50 18:51


 


Temperature 99.2 F  


 


Pulse Rate 74 65 64


 


Respiratory 22 14 16





Rate   


 


Blood Pressure 135/41  


 


Blood Pressure   152/55





[Right]   


 


O2 Sat by Pulse 97 100 99





Oximetry   














  07/05/18 07/05/18





  18:58 19:00


 


Temperature  


 


Pulse Rate  61


 


Respiratory 16 15





Rate  


 


Blood Pressure  


 


Blood Pressure  





[Right]  


 


O2 Sat by Pulse 99 100





Oximetry  














ED Medical Decision Making





- Lab Data


Result diagrams: 


 07/05/18 17:40





 07/05/18 17:40





- Radiology Data


Radiology results: report reviewed, image reviewed


interpreted by me: 


Abdominal x-ray shows nonspecific nonobstructive bowel gas.  Increased stool 

volume.








PROCEDURE: CT ABDOMEN PELVIS WO CON 





TECHNIQUE: Computerized axial tomography of the abdomen and 


pelvis was performed without intravenous contrast. This study is 


performed without intravascular contrast material and its 


sensitivity for abdominal and pelvic pathology, including 


neoplasms, inflammation, abscess, free fluid, thrombosis, 


arterial dissection and infarction, is reduced compared with a 


contrast enhanced study. 





HISTORY: right sided abdominal pain 





COMPARISON: 04/06/2017 





FINDINGS: 


Moderate degree cardiomegaly is noted. There is diffusely 


increased density of liver and spleen. Pancreatic duct appears 


dilated in the region of pancreatic head measuring about 7 


millimeters in diameter. Bilateral kidneys are diffusely 


atrophied. Adrenal glands are within normal limits. There is no 


obstructive uropathy. Urinary bladder is empty. Aorta is of 


normal caliber. Mild degree of free fluid is noted in the 


peritoneal cavity. There is no free air. Gallbladder is partially 


distended. Small bowel loops are within normal limits. Multiple 


colonic diverticula are noted without evidence of diverticulitis. 


There is moderate degree residual stool. Appendix is not 


distinctly visualized. 3 centimeters cystic lesion is noted in 


the right adnexal region. Multiple calcified densities are noted 


in the uterus consistent with uterine fibroids. There is a 3 


centimeter soft tissue density lesion along the posterior aspect 


of uterus which also most likely represents a fibroid. There is 


diffusely increased bone density. Vertebral height is normal. 


There is diffuse subcutaneous soft tissue induration. 





IMPRESSION: 


Increased density of liver and spleen may represent etiologies 


such as hemochromatosis. 





Atrophied kidneys consistent with end-stage renal disease. 





Mild ascites 





Moderate degree cardiomegaly 





Increased bone density is consistent with renal osteodystrophy. 





Multiple calcified lesions in the uterus are consistent with 


fibroids. There is also a 3 centimeter noncalcified lesion 


posterior to the uterus which also most likely represents a 


fibroid. 





3 centimeter cystic lesion in the right adnexal region most 


likely represents a right ovarian cyst. This is increased since 


the prior study. Ultrasound evaluation is recommended. 





Appendix is not distinctly visualized. If appendicitis is a 


clinical concern follow-up study with oral contrast is 


recommended. 





Generalized anasarca. 





Dilated pancreatic duct measuring about 11 millimeters. ERCP may 


be recommended for further evaluation.. 











- Medical Decision Making


Patient came in for anemia with hemoglobin of 6.9 from Monday.  Today it is 

6.3.  The patient does have some abdominal pain and bright red blood per rectum 

with bowel movements.  Vital signs stable throughout her ED course including 

being afebrile.  CT does not show any signs of obstruction or any other 

significant acute process.  Spoke with nephrology who will give the patient 

dialysis tomorrow and has agreed to do the transfusion at that time, per the 

patient's request.  I do believe the patient is stable to wait for tomorrow for 

dialysis.  She may need gastroenterology consultation as well.  Patient has 

been accepted for admission by the hospitalist, Dr. Raymond. 








- Differential Diagnosis


hemorrhoid, malignancy, gastritis, diverticulosis


Critical Care Time: No


Critical care attestation.: 


If time is entered above; I have spent that time in minutes in the direct care 

of this critically ill patient, excluding procedure time.








ED Disposition


Clinical Impression: 


 ESRD (end stage renal disease), Symptomatic anemia





GI bleed


Qualifiers:


 GI bleed type/associated pathology: unspecified gastrointestinal hemorrhage 

type Qualified Code(s): K92.2 - Gastrointestinal hemorrhage, unspecified





Disposition: [id number] OP ADMIT IP TO THIS HOSP


Is pt being admited?: Yes


Condition: Fair


Time of Disposition: 22:30

## 2018-07-05 NOTE — CAT SCAN REPORT
FINAL REPORT



PROCEDURE:  CT ABDOMEN PELVIS WO CON



TECHNIQUE:  Computerized axial tomography of the abdomen and

pelvis was performed without intravenous contrast. This study is

performed without intravascular contrast material and its

sensitivity for abdominal and pelvic pathology, including

neoplasms, inflammation, abscess, free fluid, thrombosis,

arterial dissection and infarction, is reduced compared with a

contrast enhanced study. 



HISTORY:  right sided abdominal pain 



COMPARISON:  04/06/2017



FINDINGS:  

Moderate degree cardiomegaly is noted. There is diffusely

increased density of liver and spleen. Pancreatic duct appears

dilated in the region of pancreatic head measuring about 7

millimeters in diameter. Bilateral kidneys are diffusely

atrophied. Adrenal glands are within normal limits. There is no

obstructive uropathy. Urinary bladder is empty. Aorta is of

normal caliber. Mild degree of free fluid is noted in the

peritoneal cavity. There is no free air. Gallbladder is partially

distended. Small bowel loops are within normal limits. Multiple

colonic diverticula are noted without evidence of diverticulitis.

There is moderate degree residual stool. Appendix is not

distinctly visualized. 3 centimeters cystic lesion is noted in

the right adnexal region. Multiple calcified densities are noted

in the uterus consistent with uterine fibroids. There is a 3

centimeter soft tissue density lesion along the posterior aspect

of uterus which also most likely represents a fibroid. There is

diffusely increased bone density. Vertebral height is normal.

There is diffuse subcutaneous soft tissue induration. 



IMPRESSION:  

Increased density of liver and spleen may represent etiologies

such as hemochromatosis.



Atrophied kidneys consistent with end-stage renal disease.



Mild ascites



Moderate degree cardiomegaly



Increased bone density is consistent with renal osteodystrophy.



Multiple calcified lesions in the uterus are consistent with

fibroids. There is also a 3 centimeter noncalcified lesion

posterior to the uterus which also most likely represents a

fibroid.



3 centimeter cystic lesion in the right adnexal region most

likely represents a right ovarian cyst. This is increased since

the prior study. Ultrasound evaluation is recommended.



Appendix is not distinctly visualized. If appendicitis is a

clinical concern follow-up study with oral contrast is

recommended.



Generalized anasarca.



Dilated pancreatic duct measuring about 11 millimeters. ERCP may

be recommended for further evaluation..

## 2018-07-06 LAB
HCT VFR BLD CALC: 17.9 % (ref 30.3–42.9)
HCT VFR BLD CALC: 18.4 % (ref 30.3–42.9)
HCT VFR BLD CALC: 18.8 % (ref 30.3–42.9)
HCT VFR BLD CALC: 19 % (ref 30.3–42.9)
HGB BLD-MCNC: 5.5 GM/DL (ref 10.1–14.3)
HGB BLD-MCNC: 5.8 GM/DL (ref 10.1–14.3)
HGB BLD-MCNC: 5.9 GM/DL (ref 10.1–14.3)
HGB BLD-MCNC: 5.9 GM/DL (ref 10.1–14.3)

## 2018-07-06 PROCEDURE — 5A1D70Z PERFORMANCE OF URINARY FILTRATION, INTERMITTENT, LESS THAN 6 HOURS PER DAY: ICD-10-PCS | Performed by: INTERNAL MEDICINE

## 2018-07-06 PROCEDURE — 30233N1 TRANSFUSION OF NONAUTOLOGOUS RED BLOOD CELLS INTO PERIPHERAL VEIN, PERCUTANEOUS APPROACH: ICD-10-PCS | Performed by: INTERNAL MEDICINE

## 2018-07-06 RX ADMIN — PRAZOSIN HYDROCHLORIDE SCH: 5 CAPSULE ORAL at 10:00

## 2018-07-06 RX ADMIN — NIFEDIPINE SCH: 30 TABLET, FILM COATED, EXTENDED RELEASE ORAL at 10:00

## 2018-07-06 RX ADMIN — HYDROMORPHONE HYDROCHLORIDE PRN MG: 1 INJECTION, SOLUTION INTRAMUSCULAR; INTRAVENOUS; SUBCUTANEOUS at 22:01

## 2018-07-06 RX ADMIN — HYDROMORPHONE HYDROCHLORIDE PRN MG: 1 INJECTION, SOLUTION INTRAMUSCULAR; INTRAVENOUS; SUBCUTANEOUS at 13:31

## 2018-07-06 RX ADMIN — HYDROMORPHONE HYDROCHLORIDE PRN MG: 1 INJECTION, SOLUTION INTRAMUSCULAR; INTRAVENOUS; SUBCUTANEOUS at 06:15

## 2018-07-06 RX ADMIN — NIFEDIPINE SCH MG: 30 TABLET, FILM COATED, EXTENDED RELEASE ORAL at 21:53

## 2018-07-06 RX ADMIN — PRAZOSIN HYDROCHLORIDE SCH MG: 5 CAPSULE ORAL at 21:51

## 2018-07-06 RX ADMIN — MINOXIDIL SCH: 10 TABLET ORAL at 23:28

## 2018-07-06 RX ADMIN — CLONIDINE HYDROCHLORIDE SCH: 0.1 TABLET ORAL at 13:30

## 2018-07-06 RX ADMIN — HYDROMORPHONE HYDROCHLORIDE PRN MG: 1 INJECTION, SOLUTION INTRAMUSCULAR; INTRAVENOUS; SUBCUTANEOUS at 10:00

## 2018-07-06 RX ADMIN — HYDROMORPHONE HYDROCHLORIDE PRN MG: 1 INJECTION, SOLUTION INTRAMUSCULAR; INTRAVENOUS; SUBCUTANEOUS at 01:23

## 2018-07-06 RX ADMIN — FLUTICASONE PROPIONATE SCH MCG: 50 SPRAY, METERED NASAL at 13:28

## 2018-07-06 RX ADMIN — PANTOPRAZOLE SODIUM SCH MG: 40 INJECTION, POWDER, FOR SOLUTION INTRAVENOUS at 13:26

## 2018-07-06 RX ADMIN — CLONIDINE HYDROCHLORIDE SCH MG: 0.1 TABLET ORAL at 21:52

## 2018-07-06 NOTE — PROGRESS NOTE
Assessment and Plan


Assessment and plan: 


--Rectal bleeding;


Supportive care, GI consulted, possible endoscopy





--Severe acute blood loss anemia; hemoglobin 5.9


Type and cross and transfuse 2 units of PRBC during dialysis


Closely monitor H&H additional PRBC as needed





--HIV/AIDS; patient follows with private ID


Continue current management





--End Stage renal disease on hemodialysis; nephrology consulted


HD per schedule, transfusion during dialysis





--Hypertension; moderate control, continue current antihypertensives


And when necessary hydralazine;





--DVT prophylaxis; SCDs








History


Interval history: 


Patient Seen and examined medical review


Admitted with rectal bleeding, acute blood loss anemia


Hemoglobin 5.9


Patient complains of generalized weakness


Vital signs reviewed








Hospitalist Physical





- Constitutional


Vitals: 


 











Temp Pulse Resp BP Pulse Ox


 


 97.9 F   106 H  20   190/88   98 


 


 07/06/18 07:43  07/06/18 07:43  07/06/18 07:43  07/06/18 07:43  07/06/18 07:43











General appearance: Present: no acute distress, well-nourished





- EENT


Eyes: Present: PERRL, EOM intact





- Neck


Neck: Present: supple, normal ROM





- Respiratory


Respiratory effort: normal


Respiratory: bilateral: diminished, negative: rales, rhonchi, wheezing





- Cardiovascular


Rhythm: regular


Heart Sounds: Present: S1 & S2





- Extremities


Extremities: no ischemia, No edema





- Abdominal


General gastrointestinal: soft, non-tender, non-distended, normal bowel sounds





- Integumentary


Integumentary: Present: clear, warm





- Psychiatric


Psychiatric: appropriate mood/affect, cooperative





- Neurologic


Neurologic: CNII-XII intact, moves all extremities





Results





- Labs


CBC & Chem 7: 


 07/06/18 09:07





 07/05/18 17:40


Labs: 


 Laboratory Last Values











WBC  8.2 K/mm3 (4.5-11.0)   07/05/18  17:40    


 


RBC  2.35 M/mm3 (3.65-5.03)  L  07/05/18  17:40    


 


Hgb  5.9 gm/dl (10.1-14.3)  L*  07/06/18  09:07    


 


Hct  19.0 % (30.3-42.9)  L*  07/06/18  09:07    


 


MCV  87 fl (79-97)   07/05/18  17:40    


 


MCH  27 pg (28-32)  L  07/05/18  17:40    


 


MCHC  31 % (30-34)   07/05/18  17:40    


 


RDW  20.7 % (13.2-15.2)  H  07/05/18  17:40    


 


Plt Count  177 K/mm3 (140-440)   07/05/18  17:40    


 


Lymph % (Auto)  14.8 % (13.4-35.0)   07/05/18  17:40    


 


Mono % (Auto)  5.6 % (0.0-7.3)   07/05/18  17:40    


 


Eos % (Auto)  2.4 % (0.0-4.3)   07/05/18  17:40    


 


Baso % (Auto)  1.4 % (0.0-1.8)   07/05/18  17:40    


 


Lymph #  1.2 K/mm3 (1.2-5.4)   07/05/18  17:40    


 


Mono #  0.5 K/mm3 (0.0-0.8)   07/05/18  17:40    


 


Eos #  0.2 K/mm3 (0.0-0.4)   07/05/18  17:40    


 


Baso #  0.1 K/mm3 (0.0-0.1)   07/05/18  17:40    


 


Seg Neutrophils %  75.8 % (40.0-70.0)  H  07/05/18  17:40    


 


Seg Neutrophils #  6.2 K/mm3 (1.8-7.7)   07/05/18  17:40    


 


Sodium  139 mmol/L (137-145)   07/05/18  17:40    


 


Potassium  3.7 mmol/L (3.6-5.0)   07/05/18  17:40    


 


Chloride  95.6 mmol/L ()  L  07/05/18  17:40    


 


Carbon Dioxide  30 mmol/L (22-30)   07/05/18  17:40    


 


Anion Gap  17 mmol/L  07/05/18  17:40    


 


BUN  26 mg/dL (7-17)  H  07/05/18  17:40    


 


Creatinine  4.8 mg/dL (0.7-1.2)  H  07/05/18  17:40    


 


Estimated GFR  11 ml/min  07/05/18  17:40    


 


BUN/Creatinine Ratio  5 %  07/05/18  17:40    


 


Glucose  129 mg/dL ()  H  07/05/18  17:40    


 


Calcium  8.3 mg/dL (8.4-10.2)  L  07/05/18  17:40    


 


Total Bilirubin  0.30 mg/dL (0.1-1.2)   07/05/18  17:40    


 


AST  18 units/L (5-40)   07/05/18  17:40    


 


ALT  5 units/L (7-56)  L  07/05/18  17:40    


 


Alkaline Phosphatase  135 units/L ()  H  07/05/18  17:40    


 


Total Protein  6.4 g/dL (6.3-8.2)   07/05/18  17:40    


 


Albumin  3.0 g/dL (3.9-5)  L  07/05/18  17:40    


 


Albumin/Globulin Ratio  0.9 %  07/05/18  17:40    


 


Blood Type  B POSITIVE   07/05/18  17:40    


 


Antibody Screen  Negative   07/05/18  17:40    


 


Crossmatch  See Detail   07/05/18  17:40

## 2018-07-06 NOTE — CONSULTATION
History of Present Illness





- Reason for Consult


Consult date: 07/06/18


end stage renal disease


Requesting physician: AMY J KOCHERLA





- History of Present Illness


67-year-old -American female presents to the emergency department, sent 

in by her nephrologist, after she was found to have low hemoglobin of 6.9 from 

blood work that was done last Monday at dialysis.  Patient says that she's been 

feeling increased fatigue and generalized weakness.  She also has been having a 

24-hour history of some right-sided abdominal pain.  Currently it is 8 out of 

10 in intensity.  She says that she has had this in the past and has had a 

workup including CT scans, EGD and colonoscopy and was told that it was 

"inflammation of my stomach."  Recently she's been having some blood in the 

stool that is bright red but no difficulty with going.  She has a history of end

-stage renal disease on hemodialysis on Monday/Wednesday/Friday, CHF, HIV, 

hypertension.  She has left upper extremity dialysis access.  Her nephrologist 

is Dr. Pimentel who also functions as her primary care physician.  No recent 

travel or sick contacts at home.  She has not taken anything for her symptoms 

prior presentation.











Past History


Past Medical History: ESRD, heart failure, hypertension, other (HIV)


Past Surgical History: appendectomy, Other (removal of PD catheter, AV fistula)


Social history: denies: smoking





Medications and Allergies


 Allergies











Allergy/AdvReac Type Severity Reaction Status Date / Time


 


codeine Allergy  HALLUCINATE Verified 07/05/18 17:25


 


hydralazine AdvReac  Vomiting Verified 07/05/18 17:25











 Home Medications











 Medication  Instructions  Recorded  Confirmed  Last Taken  Type


 


Efavirenz [Sustiva] 600 mg PO DAILY 02/02/17 07/05/18 05/10/17 History


 


Minoxidil [Loniten] 10 mg PO QDAY 02/02/17 07/05/18 05/10/17 History


 


Terazosin [Hytrin] 10 mg PO BID 02/02/17 07/05/18 05/10/17 History


 


Lamivudine 1 tab PO QDAY 07/17/17 07/05/18 Unknown History


 


Tenofovir [Viread] 300 mg PO QWEEK 07/17/17 07/05/18 Unknown History


 


NIFEdipine XL [Procardia Xl] 30 mg PO Q12HR #60 tablet 07/18/17 07/05/18 

Unknown Rx


 


cloNIDine [Catapres] 0.3 mg PO BID #180 tablet 07/18/17 07/05/18 Unknown Rx


 


ALBUTEROL Inhaler [ProAir HFA 2 puff IH QID PRN #1 inhalation 06/13/18 07/05/18 

Unknown Rx





Inhaler]     


 


Fluticasone [Flonase] 1 spray NS QDAY #1 bottle 06/13/18 07/05/18 Unknown Rx











Active Meds: 


Active Medications





Acetaminophen (Tylenol)  650 mg PO Q4H PRN


   PRN Reason: Fever >101


Albuterol (Proventil)  2.5 mg IH QIDRT PRN


   PRN Reason: Shortness Of Breath


Clonidine HCl (Catapres)  0.3 mg PO BID [facility]


Efavirenz (Sustiva)  600 mg PO DAILY@2200 [facility]


Epoetin Manuel (Epogen)  20,000 unit IV REYNA PRN


   PRN Reason: hemodialysis


Fluticasone Propionate (Flonase)  50 mcg NS QDAY [facility]


   Last Admin: 07/06/18 13:28 Dose:  50 mcg


Hydromorphone HCl (Dilaudid)  0.5 mg IV Q4H PRN


   PRN Reason: Pain, Moderate (4-6)


   Last Admin: 07/06/18 13:31 Dose:  0.5 mg


Sodium Chloride (Nacl 0.9%)  100 mls @ 999 mls/hr IV REYNA PRN


   PRN Reason: Hypotension


Lamivudine (Epivir)  150 mg PO DAILY@2200 [facility]


Minoxidil (Loniten)  10 mg PO QDAY [facility]


Nifedipine (Procardia Xl)  30 mg PO Q12HR [facility]


Ondansetron HCl (Zofran)  4 mg IV Q8H PRN


   PRN Reason: Nausea And Vomiting


Pantoprazole Sodium (Protonix)  40 mg IV Q12H [facility]


   Last Admin: 07/06/18 13:26 Dose:  40 mg


Prazosin HCl (Minipress)  5 mg PO BID [facility]


Tenofovir Disoproxil Fumarate (Viread)  300 mg PO Th@2200 [facility]











Review of Systems


All systems: negative (except as noted above)





Exam





- Vital Signs


Vital signs: 


 Vital Signs











Temp Pulse Resp BP Pulse Ox


 


 99.2 F   74   22   135/41   97 


 


 07/05/18 17:25  07/05/18 17:25  07/05/18 17:25  07/05/18 17:25  07/05/18 17:25














- General Appearance


General appearance: well-developed, well-nourished, appears stated age


EENT: PERRL, mucous membranes moist


Neck: Present: neck supple, trachea midline.  Absent: JVD/HJR, Masses


Respiratory: Clear to Ascultation


Heart: regular, normal heart rate


Gastrointestinal: Present: normal, normoactive bowel sounds


Integumentary: no rash, other (1+ edema.  AV fistula in her left upper arm.  

Good bruit and thrill)





Results





- Lab Results





 07/06/18 11:54





 07/05/18 17:40


 Most recent lab results











Calcium  8.3 mg/dL (8.4-10.2)  L  07/05/18  17:40    














Assessment and Plan


Impression


* End-stage renal disease


* Symptomatic anemia


* Hypertension


* HIV disease


Recommendations


* Schedule patient for  hemodialysis today


* Transfuse 2 units of packed RBC with dialysis


* Continue dialysis on MWF schedule as outpatient


* No IV, BP or of any puncture in access arm


* Agree with GI evaluation


* adjust diet and meds for  ESRD state


* Thank you very much for the consultation.  Shall follow her along with you

## 2018-07-06 NOTE — HISTORY AND PHYSICAL REPORT
CHIEF COMPLAINT:  Abnormal lab results.



HISTORY OF PRESENT ILLNESS:  The patient is a 67-year-old female who was sent by

her neurologist to come to the Emergency Room and get checked out because of

finding of low hemoglobin of 6.9 value from lab work done last Monday at

dialysis section.  The patient also complained of generalized weakness and

fatigue and also complained of seeing bright red blood in the stool from time to

time and also has some discomfort in the right upper quadrant abdominal area. 

There is no history of nausea or vomiting.  No history of hematemesis.  The

patient also denied history of fever or chills.  Denies history of shortness of

breath, and undergoes dialysis on Mondays, Wednesdays and Friday.



PAST MEDICAL HISTORY:  Pertinent for hypertension, congestive heart failure,

gastroesophageal reflux disease, abnormal liver function tests with some lesion

in the liver area.  Also, the patient has past history of end-stage renal

disease, on dialysis and HIV infection.



PAST SURGICAL HISTORY:  Pertinent for appendectomy, dialysis, fistula on the

left arm.



FAMILY HISTORY:  Noncontributory.



SOCIAL HISTORY:  The patient does not smoke, does not drink alcohol and does not

use illicit drugs.



MEDICATIONS:  The patient is on efavirenz 600 mg by mouth daily, minoxidil 10 mg

by mouth daily, terazosin 10 mg by mouth twice daily, Daily-Cameron 1 tablet by

mouth daily, lamivudine 1 tablet by mouth daily, tenofovir 300 mg by mouth every

week, aspirin 81 mg by mouth daily, nifedipine XL 90 mg by mouth every 12 hours,

Bactrim double strength 0.5 mg by mouth every 3-4 hours, clonidine 0.3 mg by

mouth twice daily, albuterol inhaler 2 puffs inhalation q.i.d. as needed for

shortness of breath, Flonase 1 spray nasally daily, nitrofurantoin 100 mg by

mouth twice daily, and prednisone 20 mg by mouth daily.



ALLERGIES:  The patient is allergic to CODEINE and HYDRALAZINE, but has

tolerated Dilaudid.



REVIEW OF SYSTEMS:

CONSTITUTIONAL:  There is no fever, no chills, no diaphoresis.

HEENT:  There is no headache or sore throat.

CARDIOVASCULAR:  There is no chest pain or orthopnea.

RESPIRATORY:  There is no shortness of breath or cough.

GASTROINTESTINAL:  Abdominal pain present, rectal bleeding noted.  There is no

nausea, no vomiting, no diarrhea or constipation.

NEUROLOGICAL:  There is no numbness, no dizziness, no altered mental status;

generalized weakness noted.

MUSCULOSKELETAL:  There is no joint pain or swelling.

DERMATOLOGICAL:  There is no skin rash or itching.

GENITOURINARY:  There is no dysuria, hematuria or flank pain.

Rest of system review is normal.



PHYSICAL EXAMINATION:

GENERAL:  At the time of exam, the patient was found to be alert, oriented x 3,

and not in acute distress.

VITAL SIGNS:  Shows temperature of 98.4 degrees Fahrenheit, pulse of 62,

respirations 13, blood pressure 170/64, O2 sat of 99% on room air.

HEENT:  Shows pupils to be equal, round, reactive to light and accommodating. 

Extraocular muscles are intact.

NECK:  Supple with no JVD or carotid bruit.

CARDIOVASCULAR:  Shows normal first and second heart sounds with no gallop ____

grade 2/6 systolic murmur, maximal at the apex, which the patient states is

chronic.

RESPIRATORY:  Shows good air entry on both sides of the lungs with no abnormal

breath sounds.

GASTROINTESTINAL:  Shows abdomen to be full, soft, nontender with no

organomegaly or rigidity.

NEUROLOGIC:  Shows no focal deficit.

MUSCULOSKELETAL:  Shows no joint swelling or tenderness.

DERMATOLOGICAL:  Shows no skin rash.

GENITOURINARY:  Shows no costovertebral angle tenderness.



PERTINENT LABORATORY:  The patient had CBC done that shows normal white count,

low hemoglobin of 6.3, low hematocrit of 20.6 with normal MCV.  CBC differential

shows slight increase in segmented neutrophil of 75.8% with no significant

bands.  Chemistry shows normal sodium and potassium with low chloride of 95.6,

elevated BUN of 26 and elevated creatinine of 4.8 consistent with the patient's

end-stage renal disease, on dialysis.  The patient's rest of chemistry shows low

albumin of 3.0 and correspondingly low calcium of 8.3.



IMAGING STUDIES:  The patient had CT of the abdomen and pelvis done without

contrast that shows increased density of the liver and spleen that represent

etiology such as hemochromatosis, also the CT shows atrophied kidneys consistent

with end-stage renal disease, also there is finding of moderate degree of

cardiomegaly on the CT report and there are multiple calcified lesions in the

uterus, which the radiologist say this is consistent with fibroid.  There is

also finding of 3-cm cystic lesion in the right adrenal lesion most likely

representing right ovarian cyst and radiologist say that the appendix is not

distinctively visualized.  Also, there is finding of dilated pancreatic duct

measuring about 11 mm and the radiologist say that ER CT may be recommended for

further evaluation.



DIAGNOSES:

1.  Rectal bleeding.

2.  Anemia.

3.  Right ovarian cyst.

4.  Dilated pancreatic duct.

5.  Fibroids.



CARE OF PLAN:

1.  The patient will be admitted to telemetry.

2.  The patient will have hemoglobin and hematocrit monitored every 6 hours x 3

more levels.

3.   The patient will remain n.p.o. for possible endoscopy by the

gastroenterologist.

4.  The patient will have GI consult  with [facility] for management of

rectal bleed, lesions in the liver and spleen, dilated pancreatic duct.

5.  The patient will continue Nephrology consult with Dr. Herrera for management

of end-stage renal disease with possible dialysis in the morning of 07/06/2018

and during that time, decision of possible transfusion ____.

6.  The patient will have packed red blood cell type and screen.

7.  The patient will be on IV Dilaudid 0.5 mg every 4 hours as needed for pain

having tolerated Dilaudid in the emergency room.

8.  Allergic to codeine.

9.  The patient will be on IV Zofran 4 mg every 8 hours for nausea and vomiting

and will be on Tylenol 650 mg by mouth every 4 hours for fever and headache.

10.  The patient will be on IV pantoprazole 40 mg every 24 hours.  She will be

on her home medications as shown in the reconciliation section.

11.  The patient with finding of ovarian cyst ____ if symptoms of right flank

pain persists.





DD: 07/05/2018 22:24

DT: 07/06/2018 00:33

JOB# [id number]  [id number]

OCN/NTS

## 2018-07-07 VITALS — DIASTOLIC BLOOD PRESSURE: 93 MMHG | SYSTOLIC BLOOD PRESSURE: 187 MMHG

## 2018-07-07 LAB
BASOPHILS # (AUTO): 0.1 K/MM3 (ref 0–0.1)
BASOPHILS NFR BLD AUTO: 1.5 % (ref 0–1.8)
EOSINOPHIL # BLD AUTO: 0.1 K/MM3 (ref 0–0.4)
EOSINOPHIL NFR BLD AUTO: 1.6 % (ref 0–4.3)
HCT VFR BLD CALC: 24.9 % (ref 30.3–42.9)
HGB BLD-MCNC: 8.1 GM/DL (ref 10.1–14.3)
INR PPP: 1.18 (ref 0.87–1.13)
LYMPHOCYTES # BLD AUTO: 1.1 K/MM3 (ref 1.2–5.4)
LYMPHOCYTES NFR BLD AUTO: 15.2 % (ref 13.4–35)
MCH RBC QN AUTO: 28 PG (ref 28–32)
MCHC RBC AUTO-ENTMCNC: 33 % (ref 30–34)
MCV RBC AUTO: 87 FL (ref 79–97)
MONOCYTES # (AUTO): 0.3 K/MM3 (ref 0–0.8)
MONOCYTES % (AUTO): 4.8 % (ref 0–7.3)
PLATELET # BLD: 137 K/MM3 (ref 140–440)
RBC # BLD AUTO: 2.85 M/MM3 (ref 3.65–5.03)

## 2018-07-07 RX ADMIN — HYDROMORPHONE HYDROCHLORIDE PRN MG: 1 INJECTION, SOLUTION INTRAMUSCULAR; INTRAVENOUS; SUBCUTANEOUS at 11:27

## 2018-07-07 RX ADMIN — CLONIDINE HYDROCHLORIDE SCH MG: 0.1 TABLET ORAL at 10:43

## 2018-07-07 RX ADMIN — HYDROMORPHONE HYDROCHLORIDE PRN MG: 1 INJECTION, SOLUTION INTRAMUSCULAR; INTRAVENOUS; SUBCUTANEOUS at 06:43

## 2018-07-07 RX ADMIN — NIFEDIPINE SCH MG: 30 TABLET, FILM COATED, EXTENDED RELEASE ORAL at 10:43

## 2018-07-07 RX ADMIN — MINOXIDIL SCH MG: 10 TABLET ORAL at 10:43

## 2018-07-07 RX ADMIN — PRAZOSIN HYDROCHLORIDE SCH MG: 5 CAPSULE ORAL at 10:42

## 2018-07-07 RX ADMIN — FLUTICASONE PROPIONATE SCH MCG: 50 SPRAY, METERED NASAL at 10:44

## 2018-07-07 NOTE — PROGRESS NOTE
Assessment and Plan


Impression


* End-stage renal disease


* Symptomatic anemia


* Hypertension


* HIV disease


Recommendations


* Uneventful  hemodialysis yesterday


* S/P  2 units of packed RBC transfusion with dialysis


* Continue dialysis on F schedule as outpatient


* No IV, BP or of any puncture in access arm


* Further paln as per  GI services 


* Hold PO4 binders for now 


* adjust diet and meds for  ESRD state








Subjective


Date of service: 07/07/18


Interval history: 


patient feels well. Had uneventful dialysis and PRBC transfusion yesterday . No 

SOB .





Objective





- Vital Signs


Vital signs: 


 Vital Signs - 12hr











  07/07/18 07/07/18 07/07/18





  00:31 01:00 05:12


 


Temperature 99.4 F  98.6 F


 


Pulse Rate 102 H 85 80


 


Respiratory 20  20





Rate   


 


Blood Pressure 157/67  146/56


 


O2 Sat by Pulse 100  98





Oximetry   














  07/07/18 07/07/18





  07:51 08:26


 


Temperature  98.6 F


 


Pulse Rate 92 H 90


 


Respiratory  14





Rate  


 


Blood Pressure  187/93


 


O2 Sat by Pulse  100





Oximetry  














- General Appearance


General appearance: well-developed, well-nourished, appears stated age


EENT: PERRL, mucous membranes moist


Neck: no JVD, no thyromegaly, no carotid bruit, supple


Respiratory: Present: Clear to Ascultation


Cardiology: regular, normal heart rate, S1S2, no murmurs


Gastrointestinal: normal, normoactive bowel sounds


Integumentary: no rash, other (AVF left UE . Good bruit and thrill)





- Lab





 07/07/18 04:36





 07/05/18 17:40


 Most recent lab results











Calcium  8.3 mg/dL (8.4-10.2)  L  07/05/18  17:40

## 2018-07-07 NOTE — DISCHARGE SUMMARY
Providers





- Providers


Date of Admission: 


07/05/18 21:50





Date of discharge: 07/07/18


Attending physician: 


AMY J KOCHERLA





 





07/05/18 21:25


Consult to Physician [CONS] Routine 


   Comment: Dr. Caraballo spoke with Dr. Herrera @2035


   Consulting Provider: JEANNE HERRERA


   Physician Instructions: 


   Reason For Exam: Dialysis





07/06/18 06:00


Consult to Physician [CONS] Routine 


   Comment: 


   Consulting Provider: JENNIFER PORTILLO


   Physician Instructions: 


   Reason For Exam: RECTAL BLEED,HEPATOSPLENIC LESION, PANCREATIC DUCT











Primary care physician: 


PRIMARY CARE MD








Hospitalization


Reason for admission: abnormal lab/severe anemia


Condition: Fair


Pertinent studies: 


Abdomen x-ray; nonspecific intestinal gas pattern moderate residual stool, 

moderate cardiomegaly





CT abdomen and pelvis; this density of the liver or spleen such as 

hemochromatosis atrophied kidney


Renal osteodystrophy


Multiple calcified lesions in the uterus fibroids


Cystic lesion in the right adnexal region always insist


Appendix not visualized


Generalized anasarca


Bilateral pancreatic duct measuring 11 mm





Procedures: 


2 units PRBC transfusion





Hospital course: 


--Rectal bleeding;


Supportive care, GI consulted, possible endoscopy





--Severe acute blood loss anemia; hemoglobin 5.9


Type and cross and transfuse 2 units of PRBC during dialysis


Closely monitor H&H additional PRBC as needed





--HIV/AIDS; patient follows with private ID


Continue current management





--End Stage renal disease on hemodialysis; nephrology consulted


HD per schedule, transfusion during dialysis





--Hypertension; moderate control, continue current antihypertensives


And when necessary hydralazine;





--DVT prophylaxis; SCDs








Disposition: DC-01 TO HOME OR SELFCARE


Time spent for discharge: 33 min





Core Measure Documentation





- Palliative Care


Palliative Care/ Comfort Measures: Not Applicable





- Core Measures


Any of the following diagnoses?: none





Exam





- Constitutional


Vitals: 


 











Temp Pulse Resp BP Pulse Ox


 


 98.6 F   90   14   187/93   100 


 


 07/07/18 08:26  07/07/18 08:26  07/07/18 08:26  07/07/18 08:26  07/07/18 08:26











General appearance: Present: no acute distress, well-nourished





- EENT


Eyes: Present: PERRL, EOM intact





- Neck


Neck: Present: supple, normal ROM





- Respiratory


Respiratory effort: normal


Respiratory: bilateral: diminished, negative: rales, rhonchi, wheezing





- Cardiovascular


Rhythm: regular


Heart Sounds: Present: S1 & S2





- Extremities


Extremities: no ischemia, No edema





- Abdominal


General gastrointestinal: Present: soft, non-tender, non-distended, normal 

bowel sounds





- Integumentary


Integumentary: Present: clear, warm





- Musculoskeletal


Musculoskeletal: strength equal bilaterally, generalized weakness





- Psychiatric


Psychiatric: appropriate mood/affect, cooperative





- Neurologic


Neurologic: CNII-XII intact, moves all extremities





Plan


Activity: no restrictions


Diet: renal


Additional Instructions: Follow renal/hemodialysis per schedule.  Follow/

private M.D./health Department per schedule


Follow up with: 


PRIMARY CARE,MD [Primary Care Provider] - 3-5 Days


JESSICA MOREAU MD [Staff Physician] - 7 Days


NÉSTOR MORENO MD [Staff Physician] - 7 Days


Forms:  Discharge Signature Page

## 2018-07-10 ENCOUNTER — HOSPITAL ENCOUNTER (INPATIENT)
Dept: HOSPITAL 5 - ED | Age: 68
LOS: 3 days | Discharge: HOME | DRG: 377 | End: 2018-07-13
Attending: HOSPITALIST | Admitting: INTERNAL MEDICINE
Payer: MEDICARE

## 2018-07-10 DIAGNOSIS — Z79.899: ICD-10-CM

## 2018-07-10 DIAGNOSIS — Z90.49: ICD-10-CM

## 2018-07-10 DIAGNOSIS — K21.9: ICD-10-CM

## 2018-07-10 DIAGNOSIS — Z99.2: ICD-10-CM

## 2018-07-10 DIAGNOSIS — N18.6: ICD-10-CM

## 2018-07-10 DIAGNOSIS — N25.81: ICD-10-CM

## 2018-07-10 DIAGNOSIS — F17.200: ICD-10-CM

## 2018-07-10 DIAGNOSIS — D69.6: ICD-10-CM

## 2018-07-10 DIAGNOSIS — K76.9: ICD-10-CM

## 2018-07-10 DIAGNOSIS — K57.31: Primary | ICD-10-CM

## 2018-07-10 DIAGNOSIS — I50.9: ICD-10-CM

## 2018-07-10 DIAGNOSIS — K63.5: ICD-10-CM

## 2018-07-10 DIAGNOSIS — B20: ICD-10-CM

## 2018-07-10 DIAGNOSIS — Z71.6: ICD-10-CM

## 2018-07-10 DIAGNOSIS — Z88.6: ICD-10-CM

## 2018-07-10 DIAGNOSIS — D62: ICD-10-CM

## 2018-07-10 DIAGNOSIS — Z88.5: ICD-10-CM

## 2018-07-10 DIAGNOSIS — K64.8: ICD-10-CM

## 2018-07-10 DIAGNOSIS — I13.2: ICD-10-CM

## 2018-07-10 LAB
ALBUMIN SERPL-MCNC: 3 G/DL (ref 3.9–5)
ALT SERPL-CCNC: 5 UNITS/L (ref 7–56)
APTT BLD: 40.1 SEC. (ref 24.2–36.6)
BASOPHILS # (AUTO): 0.1 K/MM3 (ref 0–0.1)
BASOPHILS NFR BLD AUTO: 1 % (ref 0–1.8)
BUN SERPL-MCNC: 20 MG/DL (ref 7–17)
BUN/CREAT SERPL: 4 %
CALCIUM SERPL-MCNC: 8.3 MG/DL (ref 8.4–10.2)
EOSINOPHIL # BLD AUTO: 0.1 K/MM3 (ref 0–0.4)
EOSINOPHIL NFR BLD AUTO: 1.3 % (ref 0–4.3)
HCT VFR BLD CALC: 23.7 % (ref 30.3–42.9)
HEMOLYSIS INDEX: 9
HGB BLD-MCNC: 7.6 GM/DL (ref 10.1–14.3)
INR PPP: 1.29 (ref 0.87–1.13)
LIPASE SERPL-CCNC: 18 UNITS/L (ref 13–60)
LYMPHOCYTES # BLD AUTO: 0.8 K/MM3 (ref 1.2–5.4)
LYMPHOCYTES NFR BLD AUTO: 10.2 % (ref 13.4–35)
MCH RBC QN AUTO: 29 PG (ref 28–32)
MCHC RBC AUTO-ENTMCNC: 32 % (ref 30–34)
MCV RBC AUTO: 90 FL (ref 79–97)
MONOCYTES # (AUTO): 0.3 K/MM3 (ref 0–0.8)
MONOCYTES % (AUTO): 3.7 % (ref 0–7.3)
PLATELET # BLD: 130 K/MM3 (ref 140–440)
RBC # BLD AUTO: 2.64 M/MM3 (ref 3.65–5.03)

## 2018-07-10 PROCEDURE — 83690 ASSAY OF LIPASE: CPT

## 2018-07-10 PROCEDURE — 88305 TISSUE EXAM BY PATHOLOGIST: CPT

## 2018-07-10 PROCEDURE — 96375 TX/PRO/DX INJ NEW DRUG ADDON: CPT

## 2018-07-10 PROCEDURE — 86901 BLOOD TYPING SEROLOGIC RH(D): CPT

## 2018-07-10 PROCEDURE — 85018 HEMOGLOBIN: CPT

## 2018-07-10 PROCEDURE — 85025 COMPLETE CBC W/AUTO DIFF WBC: CPT

## 2018-07-10 PROCEDURE — 80053 COMPREHEN METABOLIC PANEL: CPT

## 2018-07-10 PROCEDURE — 82088 ASSAY OF ALDOSTERONE: CPT

## 2018-07-10 PROCEDURE — P9016 RBC LEUKOCYTES REDUCED: HCPCS

## 2018-07-10 PROCEDURE — 85730 THROMBOPLASTIN TIME PARTIAL: CPT

## 2018-07-10 PROCEDURE — 84100 ASSAY OF PHOSPHORUS: CPT

## 2018-07-10 PROCEDURE — 96374 THER/PROPH/DIAG INJ IV PUSH: CPT

## 2018-07-10 PROCEDURE — 36415 COLL VENOUS BLD VENIPUNCTURE: CPT

## 2018-07-10 PROCEDURE — 85610 PROTHROMBIN TIME: CPT

## 2018-07-10 PROCEDURE — 36430 TRANSFUSION BLD/BLD COMPNT: CPT

## 2018-07-10 PROCEDURE — 86850 RBC ANTIBODY SCREEN: CPT

## 2018-07-10 PROCEDURE — 86900 BLOOD TYPING SEROLOGIC ABO: CPT

## 2018-07-10 PROCEDURE — C9113 INJ PANTOPRAZOLE SODIUM, VIA: HCPCS

## 2018-07-10 PROCEDURE — 86920 COMPATIBILITY TEST SPIN: CPT

## 2018-07-10 PROCEDURE — 83735 ASSAY OF MAGNESIUM: CPT

## 2018-07-10 PROCEDURE — 85014 HEMATOCRIT: CPT

## 2018-07-10 NOTE — EMERGENCY DEPARTMENT REPORT
ED GI Bleed HPI





- General


Chief complaint: GI Bleed


Stated complaint: RECTAL BLEEDING


Time Seen by Provider: 07/10/18 15:26


Source: patient


Mode of arrival: Ambulatory


Limitations: No Limitations





- History of Present Illness


Initial comments: 


 Last week pt had blood in her stool and abd pain. Pt had a hgb of 5. She 

received 2U of blood. Pt was scheduled for a colonoscopy, but she has attended 

 and was unable to go through with it.  The patient was discharged home 

4 days ago.  Today, her rectal bleeding increased and she had abdominal pain.  

So, she came back to the ER for reevaluation.  Denies lightheadedness, 

weakness.  Her abdominal pain is her usual abdominal pain.


 


Severity scale (0 -10): 8





- Related Data


 Home Medications











 Medication  Instructions  Recorded  Confirmed  Last Taken


 


Efavirenz [Sustiva] 600 mg PO DAILY 02/02/17 07/10/18 05/10/17


 


Minoxidil [Loniten] 10 mg PO QDAY 02/02/17 07/10/18 05/10/17


 


Terazosin [Hytrin] 10 mg PO BID 02/02/17 07/10/18 05/10/17


 


Tenofovir [Viread] 300 mg PO QWEEK 07/17/17 07/10/18 Unknown


 


B Complex 11/Folic/C/Biot/Zinc 1 each PO QDAY 07/10/18 07/10/18 Unknown





[Dialyvite with Zinc Tablet]    


 


cloNIDine [Catapres] 0.3 mg PO TID 07/10/18 07/10/18 Unknown


 


lamiVUDine [Epivir] 150 mg PO QPM 07/10/18 07/10/18 Unknown








 Previous Rx's











 Medication  Instructions  Recorded  Last Taken  Type


 


ALBUTEROL Inhaler [ProAir HFA 2 puff IH QID PRN #1 inhalation 18 Unknown 

Rx





Inhaler]    


 


Fluticasone [Flonase] 1 spray NS QDAY #1 bottle 18 Unknown Rx











 Allergies











Allergy/AdvReac Type Severity Reaction Status Date / Time


 


codeine Allergy  HALLUCINATE Verified 18 17:25


 


hydralazine AdvReac  Vomiting Verified 18 17:25














ED Review of Systems


ROS: 


Stated complaint: RECTAL BLEEDING


Other details as noted in HPI





Comment: All other systems reviewed and negative


Gastrointestinal: abdominal pain, hematochezia





ED Past Medical Hx





- Past Medical History


Previous Medical History?: Yes


Hx Hypertension: Yes


Hx Heart Attack/AMI: No


Hx Congestive Heart Failure: Yes


Hx Diabetes: No


Hx GERD: Yes


Hx Liver Disease: Yes (abnormal liver function test, coagulopathy, mass on liver

)


Hx Renal Disease: Yes


Hx Arthritis: No


Hx Asthma: No


Hx COPD: No


Hx HIV: Yes





- Surgical History


Past Surgical History?: Yes


Hx Coronary Stent: No


Hx Open Heart Surgery: No


Hx Pacemaker: No


Hx Internal Defibrillator: No


Hx Cholecystectomy: No


Hx Appendectomy: Yes


Hx Breast Surgery: No


Additional Surgical History: PD catheter -removed.  FISTULA LEFT ARM





- Social History


Smoking Status: Current Every Day Smoker


Substance Use Type: Prescribed





- Medications


Home Medications: 


 Home Medications











 Medication  Instructions  Recorded  Confirmed  Last Taken  Type


 


Efavirenz [Sustiva] 600 mg PO DAILY 02/02/17 07/10/18 05/10/17 History


 


Minoxidil [Loniten] 10 mg PO QDAY 02/02/17 07/10/18 05/10/17 History


 


Terazosin [Hytrin] 10 mg PO BID 02/02/17 07/10/18 05/10/17 History


 


Tenofovir [Viread] 300 mg PO QWEEK 07/17/17 07/10/18 Unknown History


 


ALBUTEROL Inhaler [ProAir HFA 2 puff IH QID PRN #1 inhalation 06/13/18 07/10/18 

Unknown Rx





Inhaler]     


 


Fluticasone [Flonase] 1 spray NS QDAY #1 bottle 06/13/18 07/10/18 Unknown Rx


 


B Complex 11/Folic/C/Biot/Zinc 1 each PO QDAY 07/10/18 07/10/18 Unknown History





[Dialyvite with Zinc Tablet]     


 


cloNIDine [Catapres] 0.3 mg PO TID 07/10/18 07/10/18 Unknown History


 


lamiVUDine [Epivir] 150 mg PO QPM 07/10/18 07/10/18 Unknown History














ED Physical Exam





- General


Limitations: No Limitations


General appearance: alert, in no apparent distress





- Head


Head exam: Present: atraumatic, normocephalic





- Eye


Eye exam: Present: normal appearance





- ENT


ENT exam: Present: mucous membranes moist





- Neck


Neck exam: Present: normal inspection





- Respiratory


Respiratory exam: Present: normal lung sounds bilaterally.  Absent: respiratory 

distress





- Cardiovascular


Cardiovascular Exam: Present: regular rate, normal rhythm.  Absent: systolic 

murmur, diastolic murmur, rubs, gallop





- GI/Abdominal


GI/Abdominal exam: Present: soft, tenderness (RUQ, RLQ), normal bowel sounds.  

Absent: guarding, rebound





- Rectal


Rectal exam: Present: heme (+) stool, bloody stool





- Extremities Exam


Extremities exam: Present: normal inspection





- Back Exam


Back exam: Present: normal inspection





- Neurological Exam


Neurological exam: Present: alert, oriented X3





- Psychiatric


Psychiatric exam: Present: normal affect, normal mood





- Skin


Skin exam: Present: warm, dry, intact, normal color.  Absent: rash





ED Course


 Vital Signs











  07/10/18





  14:25


 


Temperature 98.9 F


 


Pulse Rate 86


 


Respiratory 18





Rate 


 


Blood Pressure 131/47


 


O2 Sat by Pulse 97





Oximetry 














ED Medical Decision Making





- Lab Data


Result diagrams: 


 07/10/18 15:39





 07/10/18 15:39





- Medical Decision Making


 67-year-old female with past medical history of ESRD with dialysis Monday/

Wednesday/Friday, CHF that presents to the ER with rectal bleeding.  Vital 

signs are stable.  Hemoglobin is also stable.  Patient has hematochezia on 

exam.  She had a CT abdomen/pelvis done a week ago.  Given that this is her 

usual abdominal pain, I do not see a need to repeat the CT at this point in 

time.  Patient will be admitted for GI evaluation and possible colonoscopy.








- Differential Diagnosis


malignancy, lower versus upper GI bleed, coagulopathy


Critical care attestation.: 


If time is entered above; I have spent that time in minutes in the direct care 

of this critically ill patient, excluding procedure time.








ED Disposition


Clinical Impression: 


 Lower GI bleed





Disposition:  OP ADMIT IP TO THIS HOSP


Is pt being admited?: Yes


Does the pt Need Aspirin: No


Condition: Stable


Referrals: 


PRIMARY CARE,MD [Primary Care Provider] - 3-5 Days


Forms:  Accompanied Note

## 2018-07-10 NOTE — EMERGENCY DEPARTMENT REPORT
Blank Doc





- Documentation


Documentation: 





Patient is a 67-year-old Female who was admitted last week for GI bleed.  

Patient has end-stage renal disease.  Patient states she has some diffuse 

abdominal pain as well.  Patient was to be scoped while in hospital last week 

she left AGAINST MEDICAL ADVICE because she had to go to a .  Patient 

returned because she is continued to have GI bleeding.  Patient denies any 

fevers chills nausea vomiting at this time.  Patient's last dialysis was 

yesterday.

## 2018-07-10 NOTE — HISTORY AND PHYSICAL REPORT
History of Present Illness


Date of examination: 07/10/18


Date of admission: 


07/10/18 18:10





Chief complaint: 


Chief complaint:


Lower GI bleed Since morning--one episode





History of present illness: 


 History of Present Illness:





67-year-old black female with history of end-stage disease hypertension and HIV 

presents with lower GI bleeds since morning.  Patient was recently admitted for 

GI bleed.  Patient had a hemoglobin of 5 and received 2 units of blood.  

Patient was scheduled for colonoscopy but she signed out AGAINST MEDICAL ADVICE 

because she had attend a .  This is 4 days ago.  Today the rectal 

bleeding is recurred because of which patient came back.  No syncope or 

lightheadedness.  No exacerbating or relieving factors.











Past Medical History


Previous Medical History?: Yes


Hx Hypertension: Yes


Hx Congestive Heart Failure: Yes


Hx GERD: Yes


Hx Liver Disease: Yes (abnormal liver function test, coagulopathy, mass on liver

)


Hx Renal Disease: Yes


Hx HIV: Yes





Surgical History


Past Surgical History?: Yes


Hx Appendectomy: Yes


Additional Surgical History: PD catheter -removed.  FISTULA LEFT ARM





Social History   


Smoking Status: Current Every Day Smoker


Substance Use Type: Prescribed





Family history


Htn





-Medications


Home Medications: 


 Home Medications











 Medication  Instructions  Recorded  Confirmed  Last Taken  Type


 


Efavirenz [Sustiva] 600 mg PO DAILY 02/02/17 07/10/18 05/10/17 History


 


Minoxidil [Loniten] 10 mg PO QDAY 02/02/17 07/10/18 05/10/17 History


 


Terazosin [Hytrin] 10 mg PO BID 02/02/17 07/10/18 05/10/17 History


 


Tenofovir [Viread] 300 mg PO QWEEK 07/17/17 07/10/18 Unknown History


 


ALBUTEROL Inhaler [ProAir HFA 2 puff IH QID PRN #1 inhalation 06/13/18 07/10/18 

Unknown Rx





Inhaler]     


 


Fluticasone [Flonase] 1 spray NS QDAY #1 bottle 06/13/18 07/10/18 Unknown Rx


 


B Complex 11/Folic/C/Biot/Zinc 1 each PO QDAY 07/10/18 07/10/18 Unknown History





[Dialyvite with Zinc Tablet]     


 


cloNIDine [Catapres] 0.3 mg PO TID 07/10/18 07/10/18 Unknown History


 


lamiVUDine [Epivir] 150 mg PO QPM 07/10/18 07/10/18 Unknown History











Review of Systems


ROS: 


Stated complaint: RECTAL BLEEDING


Other details as noted in HPI





Comment: All other systems reviewed and negative


Gastrointestinal: abdominal pain, hematochezia

















Medications and Allergies


 Allergies











Allergy/AdvReac Type Severity Reaction Status Date / Time


 


codeine Allergy  HALLUCINATE Verified 18 17:25


 


hydralazine AdvReac  Vomiting Verified 18 17:25











 Home Medications











 Medication  Instructions  Recorded  Confirmed  Last Taken  Type


 


Efavirenz [Sustiva] 600 mg PO DAILY 02/02/17 07/10/18 05/10/17 History


 


Minoxidil [Loniten] 10 mg PO QDAY 02/02/17 07/10/18 05/10/17 History


 


Terazosin [Hytrin] 10 mg PO BID 02/02/17 07/10/18 05/10/17 History


 


Tenofovir [Viread] 300 mg PO QWEEK 07/17/17 07/10/18 Unknown History


 


ALBUTEROL Inhaler [ProAir HFA 2 puff IH QID PRN #1 inhalation 06/13/18 07/10/18 

Unknown Rx





Inhaler]     


 


Fluticasone [Flonase] 1 spray NS QDAY #1 bottle 06/13/18 07/10/18 Unknown Rx


 


B Complex 11/Folic/C/Biot/Zinc 1 each PO QDAY 07/10/18 07/10/18 Unknown History





[Dialyvite with Zinc Tablet]     


 


cloNIDine [Catapres] 0.3 mg PO TID 07/10/18 07/10/18 Unknown History


 


lamiVUDine [Epivir] 150 mg PO QPM 07/10/18 07/10/18 Unknown History














Exam





- Constitutional


Vitals: 


 











Temp Pulse Resp BP Pulse Ox


 


 97.9 F   87   20   167/74   100 


 


 07/10/18 19:26  07/10/18 19:26  07/10/18 19:26  07/10/18 19:26  07/10/18 19:26











General appearance: Present: no acute distress, well-nourished





- EENT


Eyes: Present: PERRL


ENT: hearing intact, clear oral mucosa





- Neck


Neck: Present: supple, normal ROM





- Respiratory


Respiratory effort: normal


Respiratory: bilateral: CTA





- Cardiovascular


Heart rate: 80


Rhythm: regular


Heart Sounds: Present: S1 & S2.  Absent: rub, click





- Extremities


Extremities: no ischemia, pulses intact, pulses symmetrical, No edema


Peripheral Pulses: within normal limits





- Abdominal


General gastrointestinal: Present: soft, non-tender, non-distended, normal 

bowel sounds


Female genitourinary: Present: normal





- Rectal


Rectal Exam: stool bloody





- Integumentary


Integumentary: Present: clear, warm, dry





- Musculoskeletal


Musculoskeletal: gait normal, strength equal bilaterally





- Psychiatric


Psychiatric: appropriate mood/affect, intact judgment & insight





- Neurologic


Neurologic: CNII-XII intact, moves all extremities





- Allied Health


Allied health notes reviewed: nursing, case management





Results





- Labs


CBC & Chem 7: 


 07/10/18 15:39





 07/10/18 15:39


Labs: 


 Laboratory Last Values











WBC  8.3 K/mm3 (4.5-11.0)   07/10/18  15:39    


 


RBC  2.64 M/mm3 (3.65-5.03)  L  07/10/18  15:39    


 


Hgb  7.6 gm/dl (10.1-14.3)  L  07/10/18  15:39    


 


Hct  23.7 % (30.3-42.9)  L  07/10/18  15:39    


 


MCV  90 fl (79-97)   07/10/18  15:39    


 


MCH  29 pg (28-32)   07/10/18  15:39    


 


MCHC  32 % (30-34)   07/10/18  15:39    


 


RDW  18.7 % (13.2-15.2)  H  07/10/18  15:39    


 


Plt Count  130 K/mm3 (140-440)  L  07/10/18  15:39    


 


Lymph % (Auto)  10.2 % (13.4-35.0)  L  07/10/18  15:39    


 


Mono % (Auto)  3.7 % (0.0-7.3)   07/10/18  15:39    


 


Eos % (Auto)  1.3 % (0.0-4.3)   07/10/18  15:39    


 


Baso % (Auto)  1.0 % (0.0-1.8)   07/10/18  15:39    


 


Lymph #  0.8 K/mm3 (1.2-5.4)  L  07/10/18  15:39    


 


Mono #  0.3 K/mm3 (0.0-0.8)   07/10/18  15:39    


 


Eos #  0.1 K/mm3 (0.0-0.4)   07/10/18  15:39    


 


Baso #  0.1 K/mm3 (0.0-0.1)   07/10/18  15:39    


 


Seg Neutrophils %  83.8 % (40.0-70.0)  H  07/10/18  15:39    


 


Seg Neutrophils #  6.9 K/mm3 (1.8-7.7)   07/10/18  15:39    


 


PT  16.8 Sec. (12.2-14.9)  H  07/10/18  15:39    


 


INR  1.29  (0.87-1.13)  H  07/10/18  15:39    


 


APTT  40.1 Sec. (24.2-36.6)  H  07/10/18  15:39    


 


Sodium  140 mmol/L (137-145)   07/10/18  15:39    


 


Potassium  3.8 mmol/L (3.6-5.0)   07/10/18  15:39    


 


Chloride  95.7 mmol/L ()  L  07/10/18  15:39    


 


Carbon Dioxide  30 mmol/L (22-30)   07/10/18  15:39    


 


Anion Gap  18 mmol/L  07/10/18  15:39    


 


BUN  20 mg/dL (7-17)  H  07/10/18  15:39    


 


Creatinine  4.6 mg/dL (0.7-1.2)  H  07/10/18  15:39    


 


Estimated GFR  11 ml/min  07/10/18  15:39    


 


BUN/Creatinine Ratio  4 %  07/10/18  15:39    


 


Glucose  117 mg/dL ()  H  07/10/18  15:39    


 


Calcium  8.3 mg/dL (8.4-10.2)  L  07/10/18  15:39    


 


Total Bilirubin  0.40 mg/dL (0.1-1.2)   07/10/18  15:39    


 


AST  15 units/L (5-40)   07/10/18  15:39    


 


ALT  5 units/L (7-56)  L  07/10/18  15:39    


 


Alkaline Phosphatase  141 units/L ()  H  07/10/18  15:39    


 


Total Protein  5.6 g/dL (6.3-8.2)  L  07/10/18  15:39    


 


Albumin  3.0 g/dL (3.9-5)  L  07/10/18  15:39    


 


Albumin/Globulin Ratio  1.2 %  07/10/18  15:39    


 


Lipase  18 units/L (13-60)   07/10/18  15:39    








 Short CBC











  07/10/18 Range/Units





  15:39 


 


WBC  8.3  (4.5-11.0)  K/mm3


 


Hgb  7.6 L  (10.1-14.3)  gm/dl


 


Hct  23.7 L  (30.3-42.9)  %


 


Plt Count  130 L  (140-440)  K/mm3








 BMP











  07/10/18





  15:39


 


Sodium  140


 


Potassium  3.8


 


Chloride  95.7 L


 


Carbon Dioxide  30


 


BUN  20 H


 


Creatinine  4.6 H


 


Glucose  117 H


 


Calcium  8.3 L








 Liver Function











  07/10/18 Range/Units





  15:39 


 


Total Bilirubin  0.40  (0.1-1.2)  mg/dL


 


AST  15  (5-40)  units/L


 


ALT  5 L  (7-56)  units/L


 


Alkaline Phosphatase  141 H  ()  units/L


 


Albumin  3.0 L  (3.9-5)  g/dL














- Imaging and Cardiology


EKG: report reviewed





Assessment and Plan


Advance Directives: Yes (full code)


VTE prophylaxis?: Chemical


Plan of care discussed with patient/family: Yes





- Patient Problems


(1) Lower GI bleed


Current Visit: Yes   Status: Acute   


Plan to address problem: 


Patient to be rescheduled for colonoscopy.


IV Protonix initiated


GI consult requested











(2) Anemia due to acute blood loss


Current Visit: No   Status: Acute   


Plan to address problem: 


To transfuse 1 unit of blood








(3) HIV (human immunodeficiency virus infection)


Current Visit: Yes   Status: Chronic   


Plan to address problem: 


Continue antiretrovirals








(4) ESRD on hemodialysis


Current Visit: Yes   Status: Chronic   


Plan to address problem: 


Continue hemodialysis


Patient follows with Dr. Pimentel


Patient is on dialysis  and Friday








(5) Hypertension


Current Visit: Yes   Status: Chronic   


Qualifiers: 


   Hypertension type: essential hypertension   Qualified Code(s): I10 - 

Essential (primary) hypertension   


Plan to address problem: 


Continue antihypertensives








(6) DVT prophylaxis


Current Visit: Yes   Status: Acute   


Plan to address problem: 


Only SCDs

## 2018-07-11 LAB
ALBUMIN SERPL-MCNC: 2.7 G/DL (ref 3.9–5)
ALT SERPL-CCNC: < 5 UNITS/L (ref 7–56)
BASOPHILS # (AUTO): 0.1 K/MM3 (ref 0–0.1)
BASOPHILS NFR BLD AUTO: 1.2 % (ref 0–1.8)
BUN SERPL-MCNC: 24 MG/DL (ref 7–17)
BUN/CREAT SERPL: 4 %
CALCIUM SERPL-MCNC: 8.5 MG/DL (ref 8.4–10.2)
EOSINOPHIL # BLD AUTO: 0.1 K/MM3 (ref 0–0.4)
EOSINOPHIL NFR BLD AUTO: 1.1 % (ref 0–4.3)
HCT VFR BLD CALC: 21.8 % (ref 30.3–42.9)
HCT VFR BLD CALC: 22.8 % (ref 30.3–42.9)
HCT VFR BLD CALC: 23.1 % (ref 30.3–42.9)
HEMOLYSIS INDEX: 0
HGB BLD-MCNC: 7.1 GM/DL (ref 10.1–14.3)
HGB BLD-MCNC: 7.4 GM/DL (ref 10.1–14.3)
HGB BLD-MCNC: 7.5 GM/DL (ref 10.1–14.3)
LYMPHOCYTES # BLD AUTO: 1 K/MM3 (ref 1.2–5.4)
LYMPHOCYTES NFR BLD AUTO: 12.4 % (ref 13.4–35)
MCH RBC QN AUTO: 29 PG (ref 28–32)
MCHC RBC AUTO-ENTMCNC: 33 % (ref 30–34)
MCV RBC AUTO: 89 FL (ref 79–97)
MONOCYTES # (AUTO): 0.3 K/MM3 (ref 0–0.8)
MONOCYTES % (AUTO): 3.6 % (ref 0–7.3)
PLATELET # BLD: 139 K/MM3 (ref 140–440)
RBC # BLD AUTO: 2.57 M/MM3 (ref 3.65–5.03)

## 2018-07-11 PROCEDURE — 5A1D70Z PERFORMANCE OF URINARY FILTRATION, INTERMITTENT, LESS THAN 6 HOURS PER DAY: ICD-10-PCS | Performed by: INTERNAL MEDICINE

## 2018-07-11 PROCEDURE — 30233N1 TRANSFUSION OF NONAUTOLOGOUS RED BLOOD CELLS INTO PERIPHERAL VEIN, PERCUTANEOUS APPROACH: ICD-10-PCS | Performed by: INTERNAL MEDICINE

## 2018-07-11 RX ADMIN — Medication SCH ML: at 09:17

## 2018-07-11 RX ADMIN — HYDROMORPHONE HYDROCHLORIDE PRN MG: 1 INJECTION, SOLUTION INTRAMUSCULAR; INTRAVENOUS; SUBCUTANEOUS at 21:26

## 2018-07-11 RX ADMIN — PRAZOSIN HYDROCHLORIDE SCH MG: 5 CAPSULE ORAL at 04:58

## 2018-07-11 RX ADMIN — PRAZOSIN HYDROCHLORIDE SCH MG: 5 CAPSULE ORAL at 21:24

## 2018-07-11 RX ADMIN — Medication SCH ML: at 21:26

## 2018-07-11 RX ADMIN — HYDROMORPHONE HYDROCHLORIDE PRN MG: 1 INJECTION, SOLUTION INTRAMUSCULAR; INTRAVENOUS; SUBCUTANEOUS at 11:36

## 2018-07-11 RX ADMIN — PANTOPRAZOLE SODIUM SCH MG: 40 INJECTION, POWDER, FOR SOLUTION INTRAVENOUS at 09:16

## 2018-07-11 RX ADMIN — Medication PRN ML: at 01:43

## 2018-07-11 RX ADMIN — PANTOPRAZOLE SODIUM SCH MG: 40 INJECTION, POWDER, FOR SOLUTION INTRAVENOUS at 21:26

## 2018-07-11 NOTE — PROGRESS NOTE
Assessment and Plan





- Lower GI bleed


Nothing by mouth


Serial H&H


IV Protonix


GI.  Consulted and following


Patient for colonoscopy in am


Last colonoscopy 2-3 years ago showed colonic polyps





- Abdominal pain


Operative CT scan of abdomen and pelvis


CT done 07/05/2018 reviewed showed dilated hypokinetic duct.





-  Anemia due to acute blood loss


To transfuse 1 unit of blood





-  HIV (human immunodeficiency virus infection)


Continue antiretrovirals





-  ESRD on hemodialysis


Continue hemodialysis on MWF


Nephrology consulted and following pt





-  Hypertension


Continue antihypertensives





-  DVT prophylaxis


Only SCDs





Subjective


Date of service: 07/11/18


Principal diagnosis: GI bleeding, anemia due to acute blood loss, HIV, ESRD on 

HD


Interval history: 





Patient seen and examined. Complaining about abdominal pain.








Objective





- Exam


Narrative Exam: 





Constitutional: Well-nourished well-developed.  In no distress


Head: Normocephalic atraumatic


Eyes: Pupils are equal round and reactive to light


Nose: No enlarged turbinates, no septal deviation.


Mouth: Moist mucous membranes.


Neck: Supple no thyromegaly.  No bruit.  No JVD


Heart: Regular rate and rhythm, S1-S2 abnormal.  No rubs murmurs or gallop


Lungs: Clear to auscultation bilaterally no rales or rhonchi


Abdomen: Soft, nontender. Bowel sound are present. 


Extremities: No edema no cyanosis and no clubbing.


Neuro: Alert oriented Oriented x3. No focal sensory or motor deficit.


Skin: No rashes no hyperemic spots


Psychiatry: Euthymic. Calm.








- Constitutional


Vitals: 


 Vital Signs - 12hr











  07/11/18 07/11/18 07/11/18





  00:50 03:58 04:20


 


Temperature 99.4 F 99.0 F 


 


Pulse Rate 84 109 H 99 H


 


Respiratory 18 18 





Rate   


 


Blood Pressure 143/63 202/86 


 


Blood Pressure   





[Right]   


 


O2 Sat by Pulse 98 95 





Oximetry   














  07/11/18 07/11/18 07/11/18





  04:58 06:37 08:00


 


Temperature   99.3 F


 


Pulse Rate 112 H 127 H 109 H


 


Respiratory   20





Rate   


 


Blood Pressure 202/86 196/85 


 


Blood Pressure   169/68





[Right]   


 


O2 Sat by Pulse  95 98





Oximetry   














- Labs


CBC & Chem 7: 


 07/11/18 14:47





 07/11/18 05:37


Labs: 


 Abnormal lab results











  07/10/18 07/10/18 07/10/18 Range/Units





  15:39 15:39 15:39 


 


RBC  2.64 L    (3.65-5.03)  M/mm3


 


Hgb  7.6 L    (10.1-14.3)  gm/dl


 


Hct  23.7 L    (30.3-42.9)  %


 


RDW  18.7 H    (13.2-15.2)  %


 


Plt Count  130 L    (140-440)  K/mm3


 


Lymph % (Auto)  10.2 L    (13.4-35.0)  %


 


Lymph #  0.8 L    (1.2-5.4)  K/mm3


 


Seg Neutrophils %  83.8 H    (40.0-70.0)  %


 


PT    16.8 H  (12.2-14.9)  Sec.


 


INR    1.29 H  (0.87-1.13)  


 


APTT    40.1 H  (24.2-36.6)  Sec.


 


Chloride   95.7 L   ()  mmol/L


 


BUN   20 H   (7-17)  mg/dL


 


Creatinine   4.6 H   (0.7-1.2)  mg/dL


 


Glucose   117 H   ()  mg/dL


 


Calcium   8.3 L   (8.4-10.2)  mg/dL


 


ALT   5 L   (7-56)  units/L


 


Alkaline Phosphatase   141 H   ()  units/L


 


Total Protein   5.6 L   (6.3-8.2)  g/dL


 


Albumin   3.0 L   (3.9-5)  g/dL


 


Crossmatch     














  07/10/18 07/10/18 07/11/18 Range/Units





  23:42 23:42 05:37 


 


RBC    2.57 L  (3.65-5.03)  M/mm3


 


Hgb   7.5 L  7.4 L  (10.1-14.3)  gm/dl


 


Hct   23.1 L  22.8 L  (30.3-42.9)  %


 


RDW    18.7 H  (13.2-15.2)  %


 


Plt Count    139 L  (140-440)  K/mm3


 


Lymph % (Auto)    12.4 L  (13.4-35.0)  %


 


Lymph #    1.0 L  (1.2-5.4)  K/mm3


 


Seg Neutrophils %    81.7 H  (40.0-70.0)  %


 


PT     (12.2-14.9)  Sec.


 


INR     (0.87-1.13)  


 


APTT     (24.2-36.6)  Sec.


 


Chloride     ()  mmol/L


 


BUN     (7-17)  mg/dL


 


Creatinine     (0.7-1.2)  mg/dL


 


Glucose     ()  mg/dL


 


Calcium     (8.4-10.2)  mg/dL


 


ALT     (7-56)  units/L


 


Alkaline Phosphatase     ()  units/L


 


Total Protein     (6.3-8.2)  g/dL


 


Albumin     (3.9-5)  g/dL


 


Crossmatch  See Detail    














  07/11/18 Range/Units





  05:37 


 


RBC   (3.65-5.03)  M/mm3


 


Hgb   (10.1-14.3)  gm/dl


 


Hct   (30.3-42.9)  %


 


RDW   (13.2-15.2)  %


 


Plt Count   (140-440)  K/mm3


 


Lymph % (Auto)   (13.4-35.0)  %


 


Lymph #   (1.2-5.4)  K/mm3


 


Seg Neutrophils %   (40.0-70.0)  %


 


PT   (12.2-14.9)  Sec.


 


INR   (0.87-1.13)  


 


APTT   (24.2-36.6)  Sec.


 


Chloride  96.2 L  ()  mmol/L


 


BUN  24 H  (7-17)  mg/dL


 


Creatinine  5.4 H  (0.7-1.2)  mg/dL


 


Glucose   ()  mg/dL


 


Calcium   (8.4-10.2)  mg/dL


 


ALT  < 5 L  (7-56)  units/L


 


Alkaline Phosphatase  142 H  ()  units/L


 


Total Protein  5.8 L  (6.3-8.2)  g/dL


 


Albumin  2.7 L  (3.9-5)  g/dL


 


Crossmatch

## 2018-07-11 NOTE — GASTROENTEROLOGY CONSULTATION
<PAWAN MCQUEEN - Last Filed: 18 09:17>





History of Present Illness





- Reason for Consult


Consult date: 18


GI bleed


Requesting physician: VIKTOR REYNA





- History of Present Illness


Patient is a 66 y/o female eith PMH of ESRD on HD, CHF, HIV, and HTN who 

presented to ED with c/o rectal bleeding. She is previously known to our 

service from a recent consult on 2018 for similar symptoms. She was 

scheduled for a colonoscopy at that time but signed out AMA due to having to 

attend a . This morning patient was resting in bed w/o acute distress. 

She reports rectal bleeding reoccurred yesterday with bright red blood in 

toilet after a BM x 1 episode. No active signs of bleeding today. No 

hematemesis or melena. Admits to having some intermittent abd pain but no 

current pain. Denies wt loss, CP, SOB, dizziness, N/V, dysphagia, diarrhea, or 

constipation. EGD 2017 revealed gastritis and esophagitis. Last colonoscopy 

approximately 2-3 years ago revealed polyps per patient. No known Fhx of colon 

CA.








Past History


Past Medical History: ESRD, heart failure, hypertension, other (HIV)


Past Surgical History: appendectomy, Other (removal of PD catheter, AV fistula)


Social history: smoking





Medications and Allergies


 Allergies











Allergy/AdvReac Type Severity Reaction Status Date / Time


 


codeine Allergy  HALLUCINATE Verified 18 17:25


 


morphine Allergy  hallucinati Verified 18 00:04





   on  


 


hydralazine AdvReac  Vomiting Verified 18 17:25











 Home Medications











 Medication  Instructions  Recorded  Confirmed  Last Taken  Type


 


Efavirenz [Sustiva] 600 mg PO DAILY 02/02/17 07/10/18 05/10/17 History


 


Minoxidil [Loniten] 10 mg PO QDAY 02/02/17 07/10/18 05/10/17 History


 


Terazosin [Hytrin] 10 mg PO BID 02/02/17 07/10/18 05/10/17 History


 


Tenofovir [Viread] 300 mg PO QWEEK 07/17/17 07/10/18 Unknown History


 


ALBUTEROL Inhaler [ProAir HFA 2 puff IH QID PRN #1 inhalation 06/13/18 07/10/18 

Unknown Rx





Inhaler]     


 


Fluticasone [Flonase] 1 spray NS QDAY #1 bottle 06/13/18 07/10/18 Unknown Rx


 


B Complex 11/Folic/C/Biot/Zinc 1 each PO QDAY 07/10/18 07/10/18 Unknown History





[Dialyvite with Zinc Tablet]     


 


cloNIDine [Catapres] 0.3 mg PO TID 07/10/18 07/10/18 Unknown History


 


lamiVUDine [Epivir] 150 mg PO QPM 07/10/18 07/10/18 Unknown History











Active Meds: 


Active Medications





Acetaminophen (Tylenol)  650 mg PO Q4H PRN


   PRN Reason: Pain MILD(1-3)/Fever >100.5/HA


Albuterol (Proventil)  2.5 mg IH Q4HRT PRN


   PRN Reason: Shortness Of Breath


Clonidine HCl (Catapres-Tts Patch)  0.3 mg TD  [facility]


   Last Admin: 18 00:07 Dose:  0.3 mg


Minoxidil (Loniten)  10 mg PO QDAY [facility]


   Last Admin: 18 04:57 Dose:  10 mg


Ondansetron HCl (Zofran)  4 mg IV Q8H PRN


   PRN Reason: Nausea And Vomiting


Pantoprazole Sodium (Protonix)  40 mg IV BID JEFF


Prazosin HCl (Minipress)  10 mg PO Q12HR [facility]


   Last Admin: 18 04:58 Dose:  10 mg


Sodium Chloride (Sodium Chloride Flush Syringe 10 Ml)  10 ml IV BID JEFF


Sodium Chloride (Sodium Chloride Flush Syringe 10 Ml)  10 ml IV PRN PRN


   PRN Reason: LINE FLUSH


   Last Admin: 18 01:43 Dose:  10 ml











Review of Systems





- Review of Systems


All systems: negative


Gastrointestinal: hematochezia





Exam





- Constitutional


Vital Signs: 


 











Temp Pulse Resp BP Pulse Ox


 


 99.0 F   112 H  18   202/86   95 


 


 18 03:58  18 04:58  18 03:58  18 04:58  18 03:58











General appearance: no acute distress





- EENT


Eyes: PERRL, EOM intact


ENT: hearing intact





- Respiratory


Respiratory: bilateral: CTA





- Cardiovascular


Rhythm: other (murmur)


Heart Sounds: Present: S1 & S2





- Gastrointestinal


General gastrointestinal: Present: soft, non-tender, non-distended, normal 

bowel sounds





- Neurologic


Neurological: alert and oriented x3





- Labs


CBC & Chem 7: 


 18 05:37





 18 05:37


Lab Results: 


 Laboratory Results - last 24 hr











  07/10/18 07/10/18 07/10/18





  15:39 15:39 15:39


 


WBC  8.3  


 


RBC  2.64 L  


 


Hgb  7.6 L  


 


Hct  23.7 L  


 


MCV  90  


 


MCH  29  


 


MCHC  32  


 


RDW  18.7 H  


 


Plt Count  130 L  


 


Lymph % (Auto)  10.2 L  


 


Mono % (Auto)  3.7  


 


Eos % (Auto)  1.3  


 


Baso % (Auto)  1.0  


 


Lymph #  0.8 L  


 


Mono #  0.3  


 


Eos #  0.1  


 


Baso #  0.1  


 


Seg Neutrophils %  83.8 H  


 


Seg Neutrophils #  6.9  


 


PT    16.8 H


 


INR    1.29 H


 


APTT    40.1 H


 


Sodium   140 


 


Potassium   3.8 


 


Chloride   95.7 L 


 


Carbon Dioxide   30 


 


Anion Gap   18 


 


BUN   20 H 


 


Creatinine   4.6 H 


 


Estimated GFR   11 


 


BUN/Creatinine Ratio   4 


 


Glucose   117 H 


 


Calcium   8.3 L 


 


Total Bilirubin   0.40 


 


AST   15 


 


ALT   5 L 


 


Alkaline Phosphatase   141 H 


 


Total Protein   5.6 L 


 


Albumin   3.0 L 


 


Albumin/Globulin Ratio   1.2 


 


Lipase   18 


 


Blood Type   


 


Antibody Screen   


 


Crossmatch   














  07/10/18 07/10/18 07/11/18





  23:42 23:42 05:37


 


WBC    8.2


 


RBC    2.57 L


 


Hgb   7.5 L  7.4 L


 


Hct   23.1 L  22.8 L


 


MCV    89


 


MCH    29


 


MCHC    33


 


RDW    18.7 H


 


Plt Count    139 L


 


Lymph % (Auto)    12.4 L


 


Mono % (Auto)    3.6


 


Eos % (Auto)    1.1


 


Baso % (Auto)    1.2


 


Lymph #    1.0 L


 


Mono #    0.3


 


Eos #    0.1


 


Baso #    0.1


 


Seg Neutrophils %    81.7 H


 


Seg Neutrophils #    6.7


 


PT   


 


INR   


 


APTT   


 


Sodium   


 


Potassium   


 


Chloride   


 


Carbon Dioxide   


 


Anion Gap   


 


BUN   


 


Creatinine   


 


Estimated GFR   


 


BUN/Creatinine Ratio   


 


Glucose   


 


Calcium   


 


Total Bilirubin   


 


AST   


 


ALT   


 


Alkaline Phosphatase   


 


Total Protein   


 


Albumin   


 


Albumin/Globulin Ratio   


 


Lipase   


 


Blood Type  B POSITIVE  


 


Antibody Screen  Negative  


 


Crossmatch  See Detail  














  18





  05:37


 


WBC 


 


RBC 


 


Hgb 


 


Hct 


 


MCV 


 


MCH 


 


MCHC 


 


RDW 


 


Plt Count 


 


Lymph % (Auto) 


 


Mono % (Auto) 


 


Eos % (Auto) 


 


Baso % (Auto) 


 


Lymph # 


 


Mono # 


 


Eos # 


 


Baso # 


 


Seg Neutrophils % 


 


Seg Neutrophils # 


 


PT 


 


INR 


 


APTT 


 


Sodium  140


 


Potassium  3.6


 


Chloride  96.2 L


 


Carbon Dioxide  29


 


Anion Gap  18


 


BUN  24 H


 


Creatinine  5.4 H


 


Estimated GFR  10


 


BUN/Creatinine Ratio  4


 


Glucose  97


 


Calcium  8.5


 


Total Bilirubin  0.30


 


AST  14


 


ALT  < 5 L


 


Alkaline Phosphatase  142 H


 


Total Protein  5.8 L


 


Albumin  2.7 L


 


Albumin/Globulin Ratio  0.9


 


Lipase 


 


Blood Type 


 


Antibody Screen 


 


Crossmatch 














Assessment and Plan


1.anemia


 2.hematochezia





-HGB 7.4


-continue to monitor H/H and transfuse as needed


-hold blood thinning medications


-BRBPR x 1 episode yesterday-no active signs of bleeding today


-currently HD stable


-last EGD 2017 showed gastritis and esophagitis


-last colonoscopy approximately 2-3 years ago showed polyps


-etiology unclear- possibly anorectal vs diverticular vs other


-will schedule for a colonoscopy in am


-clear liquids today then NPO after MN


-continue supportive care








<DA SIEGEL - Last Filed: 18 11:53>





Medications and Allergies


Active Meds: 


Active Medications





Acetaminophen (Tylenol)  650 mg PO Q4H PRN


   PRN Reason: Pain MILD(1-3)/Fever >100.5/HA


Albuterol (Proventil)  2.5 mg IH Q4HRT PRN


   PRN Reason: Shortness Of Breath


Clonidine HCl (Catapres-Tts Patch)  0.3 mg TD Tu [facility]


   Last Admin: 18 00:07 Dose:  0.3 mg


Hydromorphone HCl (Dilaudid)  0.5 mg IV Q8HR PRN


   PRN Reason: Pain , Severe (7-10)


   Last Admin: 18 11:36 Dose:  0.5 mg


Minoxidil (Loniten)  10 mg PO QDAY [facility]


   Last Admin: 18 04:57 Dose:  10 mg


Ondansetron HCl (Zofran)  4 mg IV Q8H PRN


   PRN Reason: Nausea And Vomiting


   Last Admin: 18 11:36 Dose:  4 mg


Pantoprazole Sodium (Protonix)  40 mg IV BID [facility]


   Last Admin: 18 09:16 Dose:  40 mg


Polyethylene Glycol/Electrolytes (Golytely)  4,000 ml PO ONCE NR


   Stop: 18 12:00


Prazosin HCl (Minipress)  10 mg PO Q12HR [facility]


   Last Admin: 18 04:58 Dose:  10 mg


Sodium Chloride (Sodium Chloride Flush Syringe 10 Ml)  10 ml IV BID [facility]


   Last Admin: 18 09:17 Dose:  10 ml


Sodium Chloride (Sodium Chloride Flush Syringe 10 Ml)  10 ml IV PRN PRN


   PRN Reason: LINE FLUSH


   Last Admin: 18 01:43 Dose:  10 ml











Exam





- Constitutional


Vital Signs: 


 











Temp Pulse Resp BP Pulse Ox


 


 99.3 F   109 H  20   169/68   98 


 


 18 08:00  18 08:00  18 08:00  18 08:00  18 08:00














- Labs


CBC & Chem 7: 


 18 05:37





 18 05:37


Lab Results: 


 Laboratory Results - last 24 hr











  07/10/18 07/10/18 07/10/18





  15:39 15:39 15:39


 


WBC  8.3  


 


RBC  2.64 L  


 


Hgb  7.6 L  


 


Hct  23.7 L  


 


MCV  90  


 


MCH  29  


 


MCHC  32  


 


RDW  18.7 H  


 


Plt Count  130 L  


 


Lymph % (Auto)  10.2 L  


 


Mono % (Auto)  3.7  


 


Eos % (Auto)  1.3  


 


Baso % (Auto)  1.0  


 


Lymph #  0.8 L  


 


Mono #  0.3  


 


Eos #  0.1  


 


Baso #  0.1  


 


Seg Neutrophils %  83.8 H  


 


Seg Neutrophils #  6.9  


 


PT    16.8 H


 


INR    1.29 H


 


APTT    40.1 H


 


Sodium   140 


 


Potassium   3.8 


 


Chloride   95.7 L 


 


Carbon Dioxide   30 


 


Anion Gap   18 


 


BUN   20 H 


 


Creatinine   4.6 H 


 


Estimated GFR   11 


 


BUN/Creatinine Ratio   4 


 


Glucose   117 H 


 


Calcium   8.3 L 


 


Total Bilirubin   0.40 


 


AST   15 


 


ALT   5 L 


 


Alkaline Phosphatase   141 H 


 


Total Protein   5.6 L 


 


Albumin   3.0 L 


 


Albumin/Globulin Ratio   1.2 


 


Lipase   18 


 


Blood Type   


 


Antibody Screen   


 


Crossmatch   














  07/10/18 07/10/18 07/11/18





  23:42 23:42 05:37


 


WBC    8.2


 


RBC    2.57 L


 


Hgb   7.5 L  7.4 L


 


Hct   23.1 L  22.8 L


 


MCV    89


 


MCH    29


 


MCHC    33


 


RDW    18.7 H


 


Plt Count    139 L


 


Lymph % (Auto)    12.4 L


 


Mono % (Auto)    3.6


 


Eos % (Auto)    1.1


 


Baso % (Auto)    1.2


 


Lymph #    1.0 L


 


Mono #    0.3


 


Eos #    0.1


 


Baso #    0.1


 


Seg Neutrophils %    81.7 H


 


Seg Neutrophils #    6.7


 


PT   


 


INR   


 


APTT   


 


Sodium   


 


Potassium   


 


Chloride   


 


Carbon Dioxide   


 


Anion Gap   


 


BUN   


 


Creatinine   


 


Estimated GFR   


 


BUN/Creatinine Ratio   


 


Glucose   


 


Calcium   


 


Total Bilirubin   


 


AST   


 


ALT   


 


Alkaline Phosphatase   


 


Total Protein   


 


Albumin   


 


Albumin/Globulin Ratio   


 


Lipase   


 


Blood Type  B POSITIVE  


 


Antibody Screen  Negative  


 


Crossmatch  See Detail  














  18





  05:37


 


WBC 


 


RBC 


 


Hgb 


 


Hct 


 


MCV 


 


MCH 


 


MCHC 


 


RDW 


 


Plt Count 


 


Lymph % (Auto) 


 


Mono % (Auto) 


 


Eos % (Auto) 


 


Baso % (Auto) 


 


Lymph # 


 


Mono # 


 


Eos # 


 


Baso # 


 


Seg Neutrophils % 


 


Seg Neutrophils # 


 


PT 


 


INR 


 


APTT 


 


Sodium  140


 


Potassium  3.6


 


Chloride  96.2 L


 


Carbon Dioxide  29


 


Anion Gap  18


 


BUN  24 H


 


Creatinine  5.4 H


 


Estimated GFR  10


 


BUN/Creatinine Ratio  4


 


Glucose  97


 


Calcium  8.5


 


Total Bilirubin  0.30


 


AST  14


 


ALT  < 5 L


 


Alkaline Phosphatase  142 H


 


Total Protein  5.8 L


 


Albumin  2.7 L


 


Albumin/Globulin Ratio  0.9


 


Lipase 


 


Blood Type 


 


Antibody Screen 


 


Crossmatch 














Impression/Plan





- Impression


Impression: 





The patient was personally seen and examined and I agree with the plans above. 

Care plan discussed with patient. Colonoscopy tomorrow.





Da Siegel MD

## 2018-07-11 NOTE — CONSULTATION
History of Present Illness





- History of Present Illness


Thank you for the consultation !





History of present illness:


Patient is a 67-year-old -American female who is known to our practice 

due to end-stage renal disease.  Patient does suffer from very difficult to 

control hypertension for which she has been tried on several different 

medication and return, is the only thing that works for her as she is not 

willing to change any of her medication as far as her blood pressure is 

concerned. Patient has been admitted here with GI bleed and has been taking 

Renvela about 2 tablets 3 times a day with her meals due to hyperphosphatemia. 

Patient was recently seen on 7/6/2018 with GI bleed but left AMA Over time 

patient stated that she has changed her diet and lifestyle.  She also does have 

a chronically dilated fistula in her left arm and was last seen by her vascular 

surgeon a month ago who told her that she has been doing well with Dr. Wayne

, who has known her for quite some time.  Patient upon arrival has had a 

severely low hemoglobin of 5 for which she did get packed red blood cell 

transfusion and is scheduled to get 1 more unit today


Her normal dialysis days are Monday Wednesday Friday and patient is requesting 

dialysis today








Past medical history significant for


Very difficult to control hypertension


Limited medication options available in her case


Anemia and end-stage renal disease


End stage renal disease currently on maintenance dialysis


HIV disease





Current allergies: Codeine morphine hydralazine





Social history: No history of alcohol drug tobacco abuse


Family history: Noncontributory for renal related disorder








Review of system: Positive for GI bleed weakness dizziness uncontrolled 

hypertension which is chronic


All other review of system negative





Physical examination


General: No acute distress


HEENT: Oral mucosa dry no pharyngeal erythema no nasal bleeding


Neck: Supple no evidence of any jugular venous distention no masses


Chest: Clear to auscultation anteriorly and posteriorly


Heart: Regular rate and rhythm S1 and S2 heard no S3-S4 or any pericardial rub


Abdomen: Soft nontender bowel sounds present no renal bruit no suprapubic 

masses no CVA tenderness


Extremity: Dry skin less than 1+ edema no peripheral petechial rashes


Endocrine: Thyroid not enlarged


Psychiatric: No evidence of any agitation or aggression noted


Musculoskeletal: No joint effusion noted





Lab studies and pertinent imagings were reviewed





My assessment and plan are as follows


End-stage renal disease: Patient is currently on maintenance hemodialysis on 

Monday Wednesday Friday stable metabolic control potassium 3.8 bicarbonate 30


Anemia in end-stage renal disease: Complicated by acute blood loss anemia, 

status post packed red blood cell transfusion


History of peritoneal dialysis


Current access is a fistula in the left arm


Chronic tobacco abuse: Counseled and educated


Thrombocytopenia in the setting of end-stage renal disease and HIV does need to 

see hematology


Secondary hyperparathyroidism will check phosphorus and PTH level


Accelerated hypertension requires better control, May consider increasing 

medications depending on how she feels


Tachycardia mild: Multifactorial


Check aldosteronism plasma renin level, she likely has secondary hypertension


HIV disease currently on medications


History of difficult to control hypertension currently on minoxidil to Zosyn as 

well as clonidine


Admitted with GI bleed with a hemoglobin of 5, post packed red blood cell 

transfusion


Patient was adequately counseled and educated regarding all the renal related 

issues


Prognosis remains guarded at this time to follow, necessary labs and imagings 

will be honored as indicated by the clinical course


We'll continue to follow and make recommendation from renal standpoint





Thank you for the consultation.








Past History


Past Medical History: ESRD, heart failure, hypertension, other (HIV)


Past Surgical History: appendectomy, Other (removal of PD catheter, AV fistula)


Social history: smoking





Medications and Allergies


 Allergies











Allergy/AdvReac Type Severity Reaction Status Date / Time


 


codeine Allergy  HALLUCINATE Verified 07/05/18 17:25


 


morphine Allergy  hallucinati Verified 07/11/18 00:04





   on  


 


hydralazine AdvReac  Vomiting Verified 07/05/18 17:25











 Home Medications











 Medication  Instructions  Recorded  Confirmed  Last Taken  Type


 


Efavirenz [Sustiva] 600 mg PO DAILY 02/02/17 07/10/18 05/10/17 History


 


Minoxidil [Loniten] 10 mg PO QDAY 02/02/17 07/10/18 05/10/17 History


 


Terazosin [Hytrin] 10 mg PO BID 02/02/17 07/10/18 05/10/17 History


 


Tenofovir [Viread] 300 mg PO QWEEK 07/17/17 07/10/18 Unknown History


 


ALBUTEROL Inhaler [ProAir HFA 2 puff IH QID PRN #1 inhalation 06/13/18 07/10/18 

Unknown Rx





Inhaler]     


 


Fluticasone [Flonase] 1 spray NS QDAY #1 bottle 06/13/18 07/10/18 Unknown Rx


 


B Complex 11/Folic/C/Biot/Zinc 1 each PO QDAY 07/10/18 07/10/18 Unknown History





[Dialyvite with Zinc Tablet]     


 


cloNIDine [Catapres] 0.3 mg PO TID 07/10/18 07/10/18 Unknown History


 


lamiVUDine [Epivir] 150 mg PO QPM 07/10/18 07/10/18 Unknown History











Active Meds: 


Active Medications





Acetaminophen (Tylenol)  650 mg PO Q4H PRN


   PRN Reason: Pain MILD(1-3)/Fever >100.5/HA


Albuterol (Proventil)  2.5 mg IH Q4HRT PRN


   PRN Reason: Shortness Of Breath


Clonidine HCl (Catapres-Tts Patch)  0.3 mg TD Tu [facility]


   Last Admin: 07/11/18 00:07 Dose:  0.3 mg


Minoxidil (Loniten)  10 mg PO QDAY [facility]


   Last Admin: 07/11/18 04:57 Dose:  10 mg


Ondansetron HCl (Zofran)  4 mg IV Q8H PRN


   PRN Reason: Nausea And Vomiting


Pantoprazole Sodium (Protonix)  40 mg IV BID [facility]


   Last Admin: 07/11/18 09:16 Dose:  40 mg


Polyethylene Glycol/Electrolytes (Golytely)  4,000 ml PO ONCE ONE


   Stop: 07/11/18 09:29


Prazosin HCl (Minipress)  10 mg PO Q12HR [facility]


   Last Admin: 07/11/18 04:58 Dose:  10 mg


Sodium Chloride (Sodium Chloride Flush Syringe 10 Ml)  10 ml IV BID [facility]


   Last Admin: 07/11/18 09:17 Dose:  10 ml


Sodium Chloride (Sodium Chloride Flush Syringe 10 Ml)  10 ml IV PRN PRN


   PRN Reason: LINE FLUSH


   Last Admin: 07/11/18 01:43 Dose:  10 ml











Exam





- Vital Signs


Vital signs: 


 Vital Signs











Temp Pulse Resp BP Pulse Ox


 


 98.9 F   86   18   131/47   97 


 


 07/10/18 14:25  07/10/18 14:25  07/10/18 14:25  07/10/18 14:25  07/10/18 14:25














Results





- Lab Results





 07/11/18 14:47





 07/11/18 05:37


 Most recent lab results











Calcium  8.5 mg/dL (8.4-10.2)   07/11/18  05:37

## 2018-07-12 LAB
ALBUMIN SERPL-MCNC: 3 G/DL (ref 3.9–5)
ALT SERPL-CCNC: 5 UNITS/L (ref 7–56)
BASOPHILS # (AUTO): 0.1 K/MM3 (ref 0–0.1)
BASOPHILS NFR BLD AUTO: 1 % (ref 0–1.8)
BUN SERPL-MCNC: 9 MG/DL (ref 7–17)
BUN/CREAT SERPL: 3 %
CALCIUM SERPL-MCNC: 8.7 MG/DL (ref 8.4–10.2)
EOSINOPHIL # BLD AUTO: 0.1 K/MM3 (ref 0–0.4)
EOSINOPHIL NFR BLD AUTO: 1.2 % (ref 0–4.3)
HCT VFR BLD CALC: 27 % (ref 30.3–42.9)
HEMOLYSIS INDEX: 3
HGB BLD-MCNC: 9 GM/DL (ref 10.1–14.3)
LYMPHOCYTES # BLD AUTO: 0.9 K/MM3 (ref 1.2–5.4)
LYMPHOCYTES NFR BLD AUTO: 11.3 % (ref 13.4–35)
MCH RBC QN AUTO: 30 PG (ref 28–32)
MCHC RBC AUTO-ENTMCNC: 33 % (ref 30–34)
MCV RBC AUTO: 89 FL (ref 79–97)
MONOCYTES # (AUTO): 0.3 K/MM3 (ref 0–0.8)
MONOCYTES % (AUTO): 4.2 % (ref 0–7.3)
PLATELET # BLD: 130 K/MM3 (ref 140–440)
RBC # BLD AUTO: 3.04 M/MM3 (ref 3.65–5.03)

## 2018-07-12 PROCEDURE — 0DBL8ZZ EXCISION OF TRANSVERSE COLON, VIA NATURAL OR ARTIFICIAL OPENING ENDOSCOPIC: ICD-10-PCS | Performed by: INTERNAL MEDICINE

## 2018-07-12 PROCEDURE — 0DBM8ZZ EXCISION OF DESCENDING COLON, VIA NATURAL OR ARTIFICIAL OPENING ENDOSCOPIC: ICD-10-PCS | Performed by: INTERNAL MEDICINE

## 2018-07-12 RX ADMIN — HYDROMORPHONE HYDROCHLORIDE PRN MG: 1 INJECTION, SOLUTION INTRAMUSCULAR; INTRAVENOUS; SUBCUTANEOUS at 14:41

## 2018-07-12 RX ADMIN — Medication SCH ML: at 22:35

## 2018-07-12 RX ADMIN — Medication SCH ML: at 11:05

## 2018-07-12 RX ADMIN — MINOXIDIL SCH: 10 TABLET ORAL at 11:06

## 2018-07-12 RX ADMIN — LABETALOL HYDROCHLORIDE SCH MG: 200 TABLET, FILM COATED ORAL at 14:41

## 2018-07-12 RX ADMIN — PANTOPRAZOLE SODIUM SCH MG: 40 TABLET, DELAYED RELEASE ORAL at 22:35

## 2018-07-12 RX ADMIN — PANTOPRAZOLE SODIUM SCH MG: 40 INJECTION, POWDER, FOR SOLUTION INTRAVENOUS at 11:05

## 2018-07-12 RX ADMIN — Medication PRN ML: at 05:45

## 2018-07-12 RX ADMIN — HYDROMORPHONE HYDROCHLORIDE PRN MG: 1 INJECTION, SOLUTION INTRAMUSCULAR; INTRAVENOUS; SUBCUTANEOUS at 05:45

## 2018-07-12 RX ADMIN — LABETALOL HYDROCHLORIDE SCH MG: 200 TABLET, FILM COATED ORAL at 22:35

## 2018-07-12 RX ADMIN — HYDROMORPHONE HYDROCHLORIDE PRN MG: 1 INJECTION, SOLUTION INTRAMUSCULAR; INTRAVENOUS; SUBCUTANEOUS at 22:39

## 2018-07-12 RX ADMIN — MINOXIDIL SCH: 10 TABLET ORAL at 22:28

## 2018-07-12 NOTE — PROGRESS NOTE
Subjective


Principal diagnosis: GI bleeding, anemia due to acute blood loss, HIV, ESRD on 

HD


Interval history: 








Came to evaluate the patient today in the room she has already gone down


Reviewed the vitals labs


No emergent indication for renal replacement therapy today


Will adjust blood pressure medication and follow check renin and aldosterone 

level


Difficult to control hypertension: check Hugo and renin


Add labetalol with caution, with clonidine, hold if heart rate is under 55


Discontinue alpha blocker


Increase minoxidil to 10 mg twice a day


For dialysis tomorrow morning


Patient does have secondary hypertension





Objective





- Vital Signs


Vital signs: 


 Vital Signs - 12hr











  07/11/18 07/12/18 07/12/18





  21:24 00:18 05:10


 


Temperature  99.7 F H 98.5 F


 


Pulse Rate 113 H 104 H 114 H


 


Respiratory  18 20





Rate   


 


Blood Pressure 177/75 158/74 185/82


 


O2 Sat by Pulse  100 99





Oximetry   














  07/12/18 07/12/18





  08:28 08:32


 


Temperature 98.9 F 98.9 F


 


Pulse Rate 118 H 118 H


 


Respiratory 28 H 28 H





Rate  


 


Blood Pressure 190/83 190/83


 


O2 Sat by Pulse 96 96





Oximetry  














- Lab





 07/12/18 05:01





 07/12/18 05:01


 Most recent lab results











Calcium  8.7 mg/dL (8.4-10.2)   07/12/18  05:01    


 


Phosphorus  3.20 mg/dL (2.5-4.5)   07/12/18  05:01    


 


Magnesium  1.70 mg/dL (1.7-2.3)   07/12/18  05:01

## 2018-07-12 NOTE — ANESTHESIA CONSULTATION
Anesthesia Consult and Med Hx


Date of service: 07/12/18





- Airway


Anesthetic Teeth Evaluation: Dentures (upper and lower)


ROM Head & Neck: Adequate


Mental/Hyoid Distance: Adequate


Mallampati Class: Class II


Intubation Access Assessment: Probably Good





- Pulmonary Exam


CTA: Yes





- Cardiac Exam


Cardiac Exam: RRR





- Pre-Operative Health Status


ASA Pre-Surgery Classification: ASA4


Proposed Anesthetic Plan: General, MAC





- Pre-Anesthesia Comment


Pre-Anesthesia Comments: HIV positive





- Pulmonary


Hx Smoking: No


Hx Asthma: No


COPD: No


Hx Pneumonia: No





- Cardiovascular System


Hx Hypertension: Yes


Hx Coronary Artery Disease: No (CHF (Echo 1/17: EJ 35%))


Hx Heart Attack/AMI: No


Hx Angina: No (denies)


Hx Pacemaker: No


Hx Internal Defibrillator: No


Hx Heart Murmur: Yes





- Central Nervous System


Hx Psychiatric Problems: Yes





- Gastrointestinal


Hx Ulcer: Yes


Hx Gastroesophageal Reflux Disease: Yes (history of dysphagia)





- Endocrine


Hx Renal Disease: Yes


Hx End Stage Renal Disease: Yes (HD 2010, Dialyzed yesterday, K 3.6)


Hx Liver Disease: Yes (abnormal liver function test, coagulopathy, mass on liver

)





- Hematic


Hx Anemia: Yes (received 1 U PRBC yesterday, Hg 7.3)





- Other Systems


Hx Alcohol Use: Yes


Hx Substance Use: No


Hx Cancer: No (mass on liver)


Hx Obesity: No





- Additional Comments


Anesthesia Medical History Comments: NAC

## 2018-07-12 NOTE — PROGRESS NOTE
Assessment and Plan





- Lower GI bleed


Nothing by mouth


Serial H&H


IV Protonix


GI.  Consulted and following


Patient for colonoscopy  


Last colonoscopy 2-3 years ago showed colonic polyps





- Abdominal pain


Operative CT scan of abdomen and pelvis


CT done 07/05/2018 reviewed showed dilated hypokinetic duct.





-  Anemia due to acute blood loss


To transfuse 1 unit of blood





-  HIV (human immunodeficiency virus infection)


Continue antiretrovirals





-  ESRD on hemodialysis


Continue hemodialysis on MWF


Nephrology consulted and following pt





-  Hypertension


Continue antihypertensives





-  DVT prophylaxis


Only SCDs





Subjective


Date of service: 07/12/18


Principal diagnosis: GI bleeding, anemia due to acute blood loss, HIV, ESRD on 

HD


Interval history: 





Patient seen and examined. No active bleeding. For colonoscopy today .








Objective





- Exam


Narrative Exam: 





Constitutional: Well-nourished well-developed.  In no distress


Head: Normocephalic atraumatic


Eyes: Pupils are equal round and reactive to light


Nose: No enlarged turbinates, no septal deviation.


Mouth: Moist mucous membranes.


Neck: Supple no thyromegaly.  No bruit.  No JVD


Heart: Regular rate and rhythm, S1-S2 abnormal.  No rubs murmurs or gallop


Lungs: Clear to auscultation bilaterally no rales or rhonchi


Abdomen: Soft, nontender. Bowel sound are present. 


Extremities: No edema no cyanosis and no clubbing.


Neuro: Alert oriented Oriented x3. No focal sensory or motor deficit.


Skin: No rashes no hyperemic spots


Psychiatry: Euthymic.Calm.








- Constitutional


Vitals: 


 Vital Signs - 12hr











  07/11/18 07/11/18 07/11/18





  20:41 21:00 21:24


 


Temperature 98.1 F  


 


Pulse Rate 104 H 108 H 113 H


 


Respiratory 18  





Rate   


 


Blood Pressure 177/75  177/75


 


O2 Sat by Pulse 96  





Oximetry   














  07/12/18 07/12/18 07/12/18





  00:18 05:10 08:28


 


Temperature 99.7 F H 98.5 F 98.9 F


 


Pulse Rate 104 H 114 H 118 H


 


Respiratory 18 20 28 H





Rate   


 


Blood Pressure 158/74 185/82 190/83


 


O2 Sat by Pulse 100 99 96





Oximetry   














  07/12/18





  08:32


 


Temperature 98.9 F


 


Pulse Rate 118 H


 


Respiratory 28 H





Rate 


 


Blood Pressure 190/83


 


O2 Sat by Pulse 96





Oximetry 














- Labs


CBC & Chem 7: 


 07/12/18 05:01





 07/12/18 05:01


Labs: 


 Abnormal lab results











  07/10/18 07/11/18 07/12/18 Range/Units





  23:42 14:47 05:01 


 


RBC    3.04 L  (3.65-5.03)  M/mm3


 


Hgb   7.1 L  9.0 L  (10.1-14.3)  gm/dl


 


Hct   21.8 L  27.0 L  (30.3-42.9)  %


 


RDW    18.7 H  (13.2-15.2)  %


 


Plt Count    130 L  (140-440)  K/mm3


 


Lymph % (Auto)    11.3 L  (13.4-35.0)  %


 


Lymph #    0.9 L  (1.2-5.4)  K/mm3


 


Seg Neutrophils %    82.3 H  (40.0-70.0)  %


 


Potassium     (3.6-5.0)  mmol/L


 


Chloride     ()  mmol/L


 


Carbon Dioxide     (22-30)  mmol/L


 


Creatinine     (0.7-1.2)  mg/dL


 


ALT     (7-56)  units/L


 


Alkaline Phosphatase     ()  units/L


 


Total Protein     (6.3-8.2)  g/dL


 


Albumin     (3.9-5)  g/dL


 


Crossmatch  See Detail    














  07/12/18 Range/Units





  05:01 


 


RBC   (3.65-5.03)  M/mm3


 


Hgb   (10.1-14.3)  gm/dl


 


Hct   (30.3-42.9)  %


 


RDW   (13.2-15.2)  %


 


Plt Count   (140-440)  K/mm3


 


Lymph % (Auto)   (13.4-35.0)  %


 


Lymph #   (1.2-5.4)  K/mm3


 


Seg Neutrophils %   (40.0-70.0)  %


 


Potassium  3.3 L  (3.6-5.0)  mmol/L


 


Chloride  94.8 L  ()  mmol/L


 


Carbon Dioxide  31 H  (22-30)  mmol/L


 


Creatinine  3.3 H  (0.7-1.2)  mg/dL


 


ALT  5 L  (7-56)  units/L


 


Alkaline Phosphatase  148 H  ()  units/L


 


Total Protein  6.0 L  (6.3-8.2)  g/dL


 


Albumin  3.0 L  (3.9-5)  g/dL


 


Crossmatch

## 2018-07-12 NOTE — OPERATIVE REPORT
Operative Report


Operative Report: 





Date of procedure: 07/12/2018





Preprocedure diagnosis: Recurrent hematochezia





Post procedure diagnosis: Multiple colon polyps.  Extensive diverticulosis of 

the left colon.  Small internal hemorrhoids.  Suboptimal prep.





Procedure: Colonoscopy to the cecum with multiple snare polypectomies.





Endoscopist: Dr. Siegel





Anesthesia: Monitored anesthesia care per anesthesia department





Estimated blood loss: 0





Medications: Monitored anesthesia care.  See separate report by anesthesia for 

details.





After careful discussion of the nature and purpose of the procedure as well as 

details of the technique risks benefits and alternatives the patient gave 

consent.  Please see recent history and physical from the office.  The patient 

was placed in the left lateral decubitus position and medicated per anesthesia.

  A rectal exam was performed sphincter tone was normal there were no masses 

palpable.





The [employer] 570 scope was passed transanally and advanced under continuous 

direct vision without difficulty to the cecum.  The colon was fair with some 

areas of thick liquids stool precluding detailed mucosal inspection.  The cecum 

was normal.  The ascending colon was normal and on forward and retroflexed 

views.  The transverse colon revealed a sessile 1 cm polyp with inflammatory 

features.  The polyp was removed with snare electrocautery without difficulty.  

The polyp was retrieved by suction.  The descending colon, and sigmoid colon 

revealed extensive diverticulosis.  3 polyps were found in the descending colon 

from 8 mm to 1.2 cm in size.  All polyps were pedunculated and were removed 

completely with snare electrocautery.  One polyp however was lost due to the 

poor prep.  The rectum revealed small internal hemorrhoids on retroflex view 

but was otherwise normal.    The procedure was well-tolerated overall and the 

patient was observed in recovery.





Conclusions: Extensive diverticulosis, probable bleeding source versus 

hemorrhoids.  4 colon polyps all benign in appearance, one was not retrieved.  

Small internal hemorrhoids.  Suboptimal prep.





Plan: Await pathology.  May advance diet and consider for discharge today from 

the GI standpoint.  Patient may follow up in the office on the pending 

pathology.  Repeat colonoscopy should be considered in a year due to the 

suboptimal prep.








Signed electronically: Tapan Siegel M.D.

## 2018-07-13 VITALS — DIASTOLIC BLOOD PRESSURE: 92 MMHG | SYSTOLIC BLOOD PRESSURE: 157 MMHG

## 2018-07-13 LAB
ALBUMIN SERPL-MCNC: 2.8 G/DL (ref 3.9–5)
ALT SERPL-CCNC: 5 UNITS/L (ref 7–56)
BASOPHILS # (AUTO): 0.1 K/MM3 (ref 0–0.1)
BASOPHILS NFR BLD AUTO: 1.3 % (ref 0–1.8)
BUN SERPL-MCNC: 13 MG/DL (ref 7–17)
BUN/CREAT SERPL: 3 %
CALCIUM SERPL-MCNC: 9 MG/DL (ref 8.4–10.2)
EOSINOPHIL # BLD AUTO: 0.1 K/MM3 (ref 0–0.4)
EOSINOPHIL NFR BLD AUTO: 1.3 % (ref 0–4.3)
HCT VFR BLD CALC: 26 % (ref 30.3–42.9)
HEMOLYSIS INDEX: 1
HGB BLD-MCNC: 8.7 GM/DL (ref 10.1–14.3)
LYMPHOCYTES # BLD AUTO: 1.3 K/MM3 (ref 1.2–5.4)
LYMPHOCYTES NFR BLD AUTO: 14.4 % (ref 13.4–35)
MCH RBC QN AUTO: 30 PG (ref 28–32)
MCHC RBC AUTO-ENTMCNC: 33 % (ref 30–34)
MCV RBC AUTO: 90 FL (ref 79–97)
MONOCYTES # (AUTO): 0.4 K/MM3 (ref 0–0.8)
MONOCYTES % (AUTO): 4.9 % (ref 0–7.3)
PLATELET # BLD: 131 K/MM3 (ref 140–440)
RBC # BLD AUTO: 2.89 M/MM3 (ref 3.65–5.03)

## 2018-07-13 PROCEDURE — 5A1D70Z PERFORMANCE OF URINARY FILTRATION, INTERMITTENT, LESS THAN 6 HOURS PER DAY: ICD-10-PCS | Performed by: INTERNAL MEDICINE

## 2018-07-13 RX ADMIN — HYDROMORPHONE HYDROCHLORIDE PRN MG: 1 INJECTION, SOLUTION INTRAMUSCULAR; INTRAVENOUS; SUBCUTANEOUS at 14:31

## 2018-07-13 RX ADMIN — MINOXIDIL SCH MG: 10 TABLET ORAL at 10:47

## 2018-07-13 RX ADMIN — LABETALOL HYDROCHLORIDE SCH: 200 TABLET, FILM COATED ORAL at 14:32

## 2018-07-13 RX ADMIN — PANTOPRAZOLE SODIUM SCH MG: 40 TABLET, DELAYED RELEASE ORAL at 10:47

## 2018-07-13 RX ADMIN — LABETALOL HYDROCHLORIDE SCH MG: 200 TABLET, FILM COATED ORAL at 08:24

## 2018-07-13 RX ADMIN — HYDROMORPHONE HYDROCHLORIDE PRN MG: 1 INJECTION, SOLUTION INTRAMUSCULAR; INTRAVENOUS; SUBCUTANEOUS at 06:44

## 2018-07-13 RX ADMIN — Medication SCH ML: at 10:48

## 2018-07-13 NOTE — PROGRESS NOTE
Subjective


Principal diagnosis: GI bleeding, anemia due to acute blood loss, HIV, ESRD on 

HD


Interval history: 


Patient was seen today for follow-up on multiple renal related issues


No complaints of any chest pain pressure or shortness of breath or any voiding 

problems


Events of this hospitalization noted, blood pressure better


Wants dialysis today


Interdisciplinary notes were also reviewed





Past medical history: Reviewed


Family history: Reviewed


Social history: Reviewed








Vitals: Reviewed


HEENT: Oral mucosa dry


Neck: Supple no thyromegaly no JVD


Chest: Clear to auscultation anteriorly no crackles or wheezes


Heart: Regular rate and rhythm S1-S2 heard no S3-S4


Abdomen: Soft nontender dry skin and no organomegaly


Extremity: Less than 1+ edema dry skin no petechial rash, fistula very tortuous


Musculoskeletal: No joint effusion noted





Assessment and plan:


End-stage renal disease: Patient is currently on maintenance hemodialysis 

Monday Wednesday and Friday she will receive her hemodialysis treatment today, 

patient does have a tortuous fistula for which she'll follow up with vascular 

surgery and was last seen about a month ago


Anemia and end-stage renal disease complicated by GI bleed in the setting of HIV

, monitor hemoglobin and hematocrit status post GI evaluation


Accelerated hypertension increase the dose of minoxidil with some success


Patient needs to be dialyzed with lower sodium bath


Overall she is doing much better from renal standpoint 


We'll continue to follow and make recommendations from renal standpoint


For any questions feel free to contact me at [phone] 








Objective





- Vital Signs


Vital signs: 


 Vital Signs - 12hr











  07/13/18 07/13/18 07/13/18





  01:00 01:28 05:39


 


Temperature 98.4 F  


 


Pulse Rate 73  73


 


Pulse Rate [ 75  





Right Radial]   


 


Respiratory 18  





Rate   


 


Respiratory  18 





Rate [Oral]   


 


Blood Pressure 152/64  


 


Blood Pressure   





[Right]   


 


O2 Sat by Pulse 96  98





Oximetry   














  07/13/18 07/13/18 07/13/18





  06:44 07:14 07:29


 


Temperature   98.2 F


 


Pulse Rate   85


 


Pulse Rate [   





Right Radial]   


 


Respiratory 18 20 18





Rate   


 


Respiratory   





Rate [Oral]   


 


Blood Pressure   173/80


 


Blood Pressure   





[Right]   


 


O2 Sat by Pulse   96





Oximetry   














  07/13/18 07/13/18 07/13/18





  08:24 08:32 10:13


 


Temperature   98.2 F


 


Pulse Rate 85  84


 


Pulse Rate [   





Right Radial]   


 


Respiratory  20 18





Rate   


 


Respiratory  20 





Rate [Oral]   


 


Blood Pressure 173/80  


 


Blood Pressure   173/80





[Right]   


 


O2 Sat by Pulse  98 97





Oximetry   














- Lab





 07/13/18 04:55





 07/13/18 04:55


 Most recent lab results











Calcium  9.0 mg/dL (8.4-10.2)   07/13/18  04:55    


 


Phosphorus  3.20 mg/dL (2.5-4.5)   07/12/18  05:01    


 


Magnesium  1.70 mg/dL (1.7-2.3)   07/12/18  05:01

## 2018-07-13 NOTE — PROGRESS NOTE
Assessment and Plan





- Lower GI bleed


Nothing by mouth


Serial H&H


IV Protonix


GI.  Consulted and following


Colonoscopy showed multiple polyps and colonic polyps, extensive diverticulosis 

of the left colon and internal hemorrhoids.


No active bleeding identified.  Multiple snare polypectomies done


Last colonoscopy 2-3 years ago showed colonic polyps





- Abdominal pain


Operative CT scan of abdomen and pelvis


CT done 07/05/2018 reviewed showed dilated hypokinetic duct.





-  Anemia due to acute blood loss


To transfuse 1 unit of blood





-  HIV (human immunodeficiency virus infection)


Continue antiretrovirals





-  ESRD on hemodialysis


Continue hemodialysis on [facility]


Nephrology consulted and following pt





-  Hypertension


Continue antihypertensives





-  DVT prophylaxis


Only SCDs





- Disposition: Discharged after hemodialysis











Subjective


Date of service: 07/13/18


Principal diagnosis: GI bleeding, anemia due to acute blood loss, HIV, ESRD on 

HD


Interval history: 





Patient seen and examined. No active bleeding. Had colonoscopy yesterday with 

colonic polys identified. No active bleeding.








Objective





- Exam


Narrative Exam: 





Constitutional: Well-nourished well-developed. In no distress


Head: Normocephalic atraumatic


Eyes: Pupils are equal round and reactive to light


Nose: No enlarged turbinates, no septal deviation.


Mouth: Moist mucous membranes.


Neck: Supple no thyromegaly.  No bruit.  No JVD


Heart: Regular rate and rhythm, S1-S2 abnormal.  No rubs murmurs or gallop


Lungs: Clear to auscultation bilaterally no rales or rhonchi


Abdomen: Soft, nontender. Bowel sound are present. 


Extremities: No edema no cyanosis and no clubbing.


Neuro: Alert oriented Oriented x3. No focal sensory or motor deficit.


Skin: No rashes no hyperemic spots


Psychiatry: Euthymic.Calm.








- Constitutional


Vitals: 


 Vital Signs - 12hr











  07/12/18 07/12/18 07/13/18





  22:35 23:09 01:00


 


Temperature   98.4 F


 


Pulse Rate 73  73


 


Pulse Rate [   75





Right Radial]   


 


Respiratory  18 18





Rate   


 


Respiratory   





Rate [Oral]   


 


Blood Pressure 159/65  152/64


 


O2 Sat by Pulse   96





Oximetry   














  07/13/18 07/13/18 07/13/18





  01:28 05:39 06:44


 


Temperature   


 


Pulse Rate  73 


 


Pulse Rate [   





Right Radial]   


 


Respiratory   18





Rate   


 


Respiratory 18  





Rate [Oral]   


 


Blood Pressure   


 


O2 Sat by Pulse  98 





Oximetry   














  07/13/18 07/13/18 07/13/18





  07:14 07:29 08:24


 


Temperature  98.2 F 


 


Pulse Rate  85 85


 


Pulse Rate [   





Right Radial]   


 


Respiratory 20 18 





Rate   


 


Respiratory   





Rate [Oral]   


 


Blood Pressure  173/80 173/80


 


O2 Sat by Pulse  96 





Oximetry   














  07/13/18





  08:32


 


Temperature 


 


Pulse Rate 


 


Pulse Rate [ 





Right Radial] 


 


Respiratory 20





Rate 


 


Respiratory 20





Rate [Oral] 


 


Blood Pressure 


 


O2 Sat by Pulse 98





Oximetry 














- Labs


CBC & Chem 7: 


 07/13/18 04:55





 07/13/18 04:55


Labs: 


 Abnormal lab results











  07/13/18 07/13/18 Range/Units





  04:55 04:55 


 


RBC  2.89 L   (3.65-5.03)  M/mm3


 


Hgb  8.7 L   (10.1-14.3)  gm/dl


 


Hct  26.0 L   (30.3-42.9)  %


 


RDW  19.2 H   (13.2-15.2)  %


 


Plt Count  131 L   (140-440)  K/mm3


 


Seg Neutrophils %  78.1 H   (40.0-70.0)  %


 


Potassium   3.5 L  (3.6-5.0)  mmol/L


 


Chloride   96.3 L  ()  mmol/L


 


Creatinine   4.8 H  (0.7-1.2)  mg/dL


 


ALT   5 L  (7-56)  units/L


 


Alkaline Phosphatase   139 H  ()  units/L


 


Total Protein   5.7 L  (6.3-8.2)  g/dL


 


Albumin   2.8 L  (3.9-5)  g/dL

## 2018-07-13 NOTE — DISCHARGE SUMMARY
Providers





- Providers


Date of Admission: 


07/10/18 18:10





Date of discharge: 18


Attending physician: 


VANESSA WINKLER





 





07/10/18 23:05


Consult to Physician [CONS] Routine 


   Comment: 


   Consulting Provider: DA BETTS


   Physician Instructions: 


   Reason For Exam: GI bleed





07/10/18 23:08


Consult to Physician [CONS] Routine 


   Comment: 


   Consulting Provider: BUZZ FREDERICK


   Physician Instructions: 


   Reason For Exam: esrd











Primary care physician: 


PRIMARY CARE MD








Hospitalization


Reason for admission: GI bleeding aned amenia from acute blood loss


Condition: Stable


Pertinent studies: 





CBC, CMP


Procedures: 





Colonoscopy


Hospital course: 





67-year-old black female with history of end-stage disease hypertension and HIV 

presents with lower GI bleeds since morning.  Patient was recently admitted for 

GI bleed.  Patient had a hemoglobin of 5 and received 2 units of blood.  

Patient was scheduled for colonoscopy but she signed out AGAINST MEDICAL ADVICE 

because she had attend a .  This is 4 days ago.  Today the rectal 

bleeding is recurred because of which patient came back.  No syncope or 

lightheadedness.  No exacerbating or relieving factors.  On admission 

hemoglobin was 7.5 hematocrit 23.1. Patient had blood transfusion. Started on 

serial      H&H, IV Protonix, blood transfusion.  Colonoscopy was done.  

Multiple colonic polyp identified with snare polypectomy.  Multiple 

diverticulosis coli identified on the left scrotum.  Patient had internal 

hemorrhoids.  No active bleeding was noted.  While on admission nephrology 

consult was obtained and patient was continued on hemodialysis on  

raises and .  Pt had no more active bleeding or abdominal pain bleeding 

notice she is ever been discharged today to follow primary care physician as 

well as nephrologist and is gastroenterologist in 3-5 days, 7 days, and 10 days 

respectively


Disposition:  TO HOME OR SELFCARE


Time spent for discharge: 36 mins





Core Measure Documentation





- Palliative Care


Palliative Care/ Comfort Measures: Not Applicable





- Core Measures


Any of the following diagnoses?: none





Exam





- Physical Exam


Narrative exam: 





Constitutional: Well-nourished well-developed.In no distress


Head: Normocephalic atraumatic


Eyes: Pupils are equal round and reactive to light


Nose: No enlarged turbinates, no septal deviation.


Mouth: Moist mucous membranes.


Neck: Supple no thyromegaly.  No bruit.  No JVD


Heart: Regular rate and rhythm, S1-S2 abnormal.  No rubs murmurs or gallop


Lungs: Clear to auscultation bilaterally no rales or rhonchi


Abdomen: Soft, nontender. Bowel sound are present. 


Extremities: No edema no cyanosis and no clubbing.


Neuro: Alert oriented Oriented x3. No focal sensory or motor deficit.


Skin: No rashes no hyperemic spots


Psychiatry: Euthymic.Calm.








- Constitutional


Vitals: 


 











Temp Pulse Resp BP Pulse Ox


 


 98.9 F   80   18   183/72   98 


 


 18 15:11  18 15:11  18 15:11  18 15:11  18 15:11














Plan


Activity: advance as tolerated, fall precautions


Weight Bearing Status: Weight Bear as Tolerated


Diet: renal


Follow up with: 


PRIMARY CARE,MD [Primary Care Provider] - 3-5 Days


Forms:  Accompanied Note


Prescriptions: 


cloNIDine [Catapres] 0.3 mg PO TID #90 tablet


Labetalol [Normodyne TAB] 200 mg PO TID #90 tablet


Minoxidil [Loniten] 10 mg PO BID #60 tablet

## 2018-07-24 ENCOUNTER — HOSPITAL ENCOUNTER (INPATIENT)
Dept: HOSPITAL 5 - ED | Age: 68
LOS: 3 days | Discharge: HOME | DRG: 377 | End: 2018-07-27
Attending: INTERNAL MEDICINE | Admitting: INTERNAL MEDICINE
Payer: MEDICARE

## 2018-07-24 DIAGNOSIS — D62: ICD-10-CM

## 2018-07-24 DIAGNOSIS — D68.8: ICD-10-CM

## 2018-07-24 DIAGNOSIS — Z90.49: ICD-10-CM

## 2018-07-24 DIAGNOSIS — Z99.2: ICD-10-CM

## 2018-07-24 DIAGNOSIS — I13.2: ICD-10-CM

## 2018-07-24 DIAGNOSIS — Z88.6: ICD-10-CM

## 2018-07-24 DIAGNOSIS — K57.31: Primary | ICD-10-CM

## 2018-07-24 DIAGNOSIS — B20: ICD-10-CM

## 2018-07-24 DIAGNOSIS — K21.9: ICD-10-CM

## 2018-07-24 DIAGNOSIS — Z82.49: ICD-10-CM

## 2018-07-24 DIAGNOSIS — K62.5: ICD-10-CM

## 2018-07-24 DIAGNOSIS — I50.33: ICD-10-CM

## 2018-07-24 DIAGNOSIS — Z88.5: ICD-10-CM

## 2018-07-24 DIAGNOSIS — N18.6: ICD-10-CM

## 2018-07-24 LAB
ALBUMIN SERPL-MCNC: 2.4 G/DL (ref 3.9–5)
ALT SERPL-CCNC: 8 UNITS/L (ref 7–56)
APTT BLD: 29.5 SEC. (ref 24.2–36.6)
BASOPHILS # (AUTO): 0.1 K/MM3 (ref 0–0.1)
BASOPHILS NFR BLD AUTO: 1.2 % (ref 0–1.8)
BUN SERPL-MCNC: 22 MG/DL (ref 7–17)
BUN/CREAT SERPL: 5 %
CALCIUM SERPL-MCNC: 7.7 MG/DL (ref 8.4–10.2)
EOSINOPHIL # BLD AUTO: 0.2 K/MM3 (ref 0–0.4)
EOSINOPHIL NFR BLD AUTO: 1.6 % (ref 0–4.3)
HCT VFR BLD CALC: 20.2 % (ref 30.3–42.9)
HEMOLYSIS INDEX: 21
HGB BLD-MCNC: 6.4 GM/DL (ref 10.1–14.3)
INR PPP: 1.67 (ref 0.87–1.13)
LIPASE SERPL-CCNC: 14 UNITS/L (ref 13–60)
LYMPHOCYTES # BLD AUTO: 1 K/MM3 (ref 1.2–5.4)
LYMPHOCYTES NFR BLD AUTO: 10.7 % (ref 13.4–35)
MCH RBC QN AUTO: 29 PG (ref 28–32)
MCHC RBC AUTO-ENTMCNC: 32 % (ref 30–34)
MCV RBC AUTO: 90 FL (ref 79–97)
MONOCYTES # (AUTO): 0.5 K/MM3 (ref 0–0.8)
MONOCYTES % (AUTO): 5.5 % (ref 0–7.3)
PLATELET # BLD: 147 K/MM3 (ref 140–440)
RBC # BLD AUTO: 2.24 M/MM3 (ref 3.65–5.03)

## 2018-07-24 PROCEDURE — 30233N1 TRANSFUSION OF NONAUTOLOGOUS RED BLOOD CELLS INTO PERIPHERAL VEIN, PERCUTANEOUS APPROACH: ICD-10-PCS | Performed by: INTERNAL MEDICINE

## 2018-07-24 PROCEDURE — 71045 X-RAY EXAM CHEST 1 VIEW: CPT

## 2018-07-24 PROCEDURE — 86901 BLOOD TYPING SEROLOGIC RH(D): CPT

## 2018-07-24 PROCEDURE — C9113 INJ PANTOPRAZOLE SODIUM, VIA: HCPCS

## 2018-07-24 PROCEDURE — 86920 COMPATIBILITY TEST SPIN: CPT

## 2018-07-24 PROCEDURE — 85025 COMPLETE CBC W/AUTO DIFF WBC: CPT

## 2018-07-24 PROCEDURE — A9560 TC99M LABELED RBC: HCPCS

## 2018-07-24 PROCEDURE — 36430 TRANSFUSION BLD/BLD COMPNT: CPT

## 2018-07-24 PROCEDURE — 93005 ELECTROCARDIOGRAM TRACING: CPT

## 2018-07-24 PROCEDURE — 82271 OCCULT BLOOD OTHER SOURCES: CPT

## 2018-07-24 PROCEDURE — 86900 BLOOD TYPING SEROLOGIC ABO: CPT

## 2018-07-24 PROCEDURE — 85610 PROTHROMBIN TIME: CPT

## 2018-07-24 PROCEDURE — 78278 ACUTE GI BLOOD LOSS IMAGING: CPT

## 2018-07-24 PROCEDURE — 93010 ELECTROCARDIOGRAM REPORT: CPT

## 2018-07-24 PROCEDURE — 80053 COMPREHEN METABOLIC PANEL: CPT

## 2018-07-24 PROCEDURE — 85027 COMPLETE CBC AUTOMATED: CPT

## 2018-07-24 PROCEDURE — 36415 COLL VENOUS BLD VENIPUNCTURE: CPT

## 2018-07-24 PROCEDURE — 83690 ASSAY OF LIPASE: CPT

## 2018-07-24 PROCEDURE — 86850 RBC ANTIBODY SCREEN: CPT

## 2018-07-24 PROCEDURE — 85730 THROMBOPLASTIN TIME PARTIAL: CPT

## 2018-07-24 PROCEDURE — 94640 AIRWAY INHALATION TREATMENT: CPT

## 2018-07-24 PROCEDURE — P9016 RBC LEUKOCYTES REDUCED: HCPCS

## 2018-07-24 PROCEDURE — 80048 BASIC METABOLIC PNL TOTAL CA: CPT

## 2018-07-24 RX ADMIN — CLONIDINE HYDROCHLORIDE SCH MG: 0.1 TABLET ORAL at 16:20

## 2018-07-24 NOTE — HISTORY AND PHYSICAL REPORT
History of Present Illness


Chief complaint: 





Im bleeding


History of present illness: 


66 YO Female with HTN, Diastolic CHF, GERD, ESRD on HD (M,W,F), HIV Disease, 

Diverticulosis presents to  ED for evaluation. Pt states that she has 

experienced rectal bleeding over the past 2 days, with an episode of large 

volume rectal bleeding today.  The patient states blood was just "pouring" out 

of her like someone had "turned on a faucet."  The patient complains of lower 

abdominal pain for the past 2 days as well as some rectal burning.  The patient 

states she began feeling weak and dizzy and this prompted her to come to the 

ED.  Patient states she feels more lightheaded when sitting up.  Patient denies 

fever, chills, CP, Palpitations, NVD, Trauma, unintentional weight loss, night 

sweats, or recent ill contacts.  Patient states she was last dialyzed 

yesterday. Nephrology consulted in ED. GI consulted in ED. 








Past History


Past Medical History: ESRD, GERD, heart failure, HIV/AIDS, hypertension


Past Surgical History: appendectomy, Other (AV Fistula)


Social history: , lives with family.  denies: smoking, alcohol abuse, 

prescription drug abuse


Family history: hypertension





Medications and Allergies


 Allergies











Allergy/AdvReac Type Severity Reaction Status Date / Time


 


codeine Allergy  HALLUCINATE Verified 07/05/18 17:25


 


morphine Allergy  hallucinati Verified 07/11/18 00:04





   on  


 


hydralazine AdvReac  Vomiting Verified 07/05/18 17:25











 Home Medications











 Medication  Instructions  Recorded  Confirmed  Last Taken  Type


 


Efavirenz [Sustiva] 600 mg PO DAILY 02/02/17 07/24/18 05/10/17 History


 


Terazosin [Hytrin] 10 mg PO BID 02/02/17 07/24/18 07/23/18 History


 


Tenofovir [Viread] 300 mg PO QWEEK 07/17/17 07/24/18 07/19/18 History


 


Fluticasone [Flonase] 1 spray NS QDAY #1 bottle 06/13/18 07/24/18 Unknown Rx


 


B Complex 11/Folic/C/Biot/Zinc 1 each PO QDAY 07/10/18 07/24/18 Unknown History





[Dialyvite with Zinc Tablet]     


 


lamiVUDine [Epivir] 150 mg PO QPM 07/10/18 07/24/18 07/23/18 History


 


Minoxidil [Loniten] 10 mg PO BID #60 tablet 07/13/18 07/24/18 07/23/18 Rx


 


Calcium Acetate [Phoslo] 1,334 mg PO TIDWM 07/24/18 07/24/18 Unknown History


 


cloNIDine [Catapres] 0.2 mg PO QID 07/24/18 07/24/18 07/23/18 History














Review of Systems


Constitutional: no weight loss, no weight gain, no fever, no chills


Ears, nose, mouth and throat: no ear pain, no ear discharge, no tinnitis, no 

decreased hearing, no nose pain


Breasts: no change in shape, no swelling, no mass


Cardiovascular: no chest pain, no orthopnea, no palpitations, no rapid/

irregular heart beat, no edema


Respiratory: no cough, no cough with sputum, no excessive sputum, no hemoptysis


Gastrointestinal: BRBPR, no constipation, no change in bowel habits, no 

hematemesis, no coffee ground emesis, no early satiety


Genitourinary Female: no pelvic pain, no flank pain, no menorrhagia, no dysuria

, no urinary frequency, no urgency


Rectal: no pain, no incontinence, no bleeding


Musculoskeletal: no neck pain, no shooting arm pain, no arm numbness/tingling, 

no low back pain, no shooting leg pain, no leg numbness/tingling


Integumentary: no rash, no pruritis, no redness, no sores, no wounds


Neurological: no paralysis, no weakness, no parathesias, no numbness, no 

tingling, no seizures, no syncope, no tremors


Psychiatric: no anxiety, no memory loss, no change in sleep habits, no sleep 

disturbances, no insomnia, no hypersomnia, no change in appetite, no change in 

libido


Endocrine: no cold intolerance, no heat intolerance, no polyphagia, no 

excessive thirst, no polydipsia, no polyuria, no nocturia


Hematologic/Lymphatic: no easy bruising, no easy bleeding, no lymphadenopathy, 

no lymphedema


Allergic/Immunologic: no urticaria, no allergic rhinitis, no wheezing, no 

persistent infections, no anaphylaxis





Exam





- Constitutional


Vitals: 


 











Temp Pulse Resp BP Pulse Ox


 


 98.6 F   71   20   124/49   100 


 


 07/24/18 10:43  07/24/18 10:43  07/24/18 10:43  07/24/18 10:43  07/24/18 10:43











General appearance: Present: no acute distress, well-nourished





- EENT


Eyes: Present: PERRL


ENT: hearing intact, clear oral mucosa





- Neck


Neck: Present: supple, normal ROM





- Respiratory


Respiratory effort: normal


Respiratory: bilateral: CTA





- Cardiovascular


Heart Sounds: Present: S1 & S2.  Absent: rub, click





- Extremities


Extremities: pulses symmetrical, No edema


Peripheral Pulses: within normal limits





- Abdominal


General gastrointestinal: Present: soft, non-tender, non-distended, normal 

bowel sounds


Female genitourinary: Present: normal





- Integumentary


Integumentary: Present: clear, warm, dry





- Musculoskeletal


Musculoskeletal: gait normal, strength equal bilaterally





- Psychiatric


Psychiatric: appropriate mood/affect, intact judgment & insight





- Neurologic


Neurologic: CNII-XII intact, moves all extremities





Results





- Labs


CBC & Chem 7: 


 07/24/18 11:09





 07/24/18 11:09


Labs: 


 Abnormal lab results











  07/24/18 07/24/18 07/24/18 Range/Units





  11:09 11:09 11:09 


 


RBC  2.24 L    (3.65-5.03)  M/mm3


 


Hgb  6.4 L    (10.1-14.3)  gm/dl


 


Hct  20.2 L    (30.3-42.9)  %


 


RDW  18.0 H    (13.2-15.2)  %


 


Lymph % (Auto)  10.7 L    (13.4-35.0)  %


 


Lymph #  1.0 L    (1.2-5.4)  K/mm3


 


Seg Neutrophils %  81.0 H    (40.0-70.0)  %


 


PT   20.7 H   (12.2-14.9)  Sec.


 


INR   1.67 H   (0.87-1.13)  


 


Chloride    93.7 L  ()  mmol/L


 


BUN    22 H  (7-17)  mg/dL


 


Creatinine    4.1 H  (0.7-1.2)  mg/dL


 


Glucose    102 H  ()  mg/dL


 


Calcium    7.7 L  (8.4-10.2)  mg/dL


 


Total Protein    5.1 L  (6.3-8.2)  g/dL


 


Albumin    2.4 L  (3.9-5)  g/dL














Assessment and Plan





- Patient Problems


(1) GI bleed


Current Visit: Yes   Status: Acute   


Qualifiers: 


   GI bleed type/associated pathology: diverticulosis   Qualified Code(s): 

K57.91 - Diverticulosis of intestine, part unspecified, without perforation or 

abscess with bleeding   


Plan to address problem: 


PRBC transfusion, serial CBC, GI consulted in ED, PPI therapy, Tagged RBC scan








(2) ESRD (end stage renal disease)


Current Visit: Yes   Status: Chronic   


Plan to address problem: 


Nephrology consulted in ED, Dialysis as per renal team








(3) HIV (human immunodeficiency virus infection)


Current Visit: No   Status: Chronic   


Plan to address problem: 


continue antiretroviral therapy, supportive care. outpatient ID F/U care. 








(4) HTN (hypertension)


Current Visit: No   Status: Chronic   


Qualifiers: 


   Hypertension type: essential hypertension   Qualified Code(s): I10 - 

Essential (primary) hypertension   


Plan to address problem: 


Monitor bp q shift, resume prehospital therapy








(5) CHF (congestive heart failure)


Current Visit: Yes   Status: Acute   


Qualifiers: 


   Heart failure type: systolic   Heart failure chronicity: acute on chronic   

Qualified Code(s): I50.23 - Acute on chronic systolic (congestive) heart 

failure   


Plan to address problem: 


Strict I/O, monitor uop q shift, chest x ray, supplemental oxygen, nebulizer 

therapy, supportive care. 








(6) DVT prophylaxis


Current Visit: No   Status: Acute   


Plan to address problem: 


SCD to BLE

## 2018-07-24 NOTE — NUCLEAR MEDICINE REPORT
NUCLEAR MEDICINE GASTROINTESTINAL BLEEDING SCAN: 07/24/18 



CLINICAL: GI bleed.



TECHNIQUE: 20 mCi technetium 99 UltraTag was injected intravenously.  

Serial images were obtained up to one hour.



FINDINGS: Normal blood pool activity.Normal background activity and 

normal activity in the liver, spleen and gastric mucosa.  No suspicious 

activity.



IMPRESSION: Negative study.No evidence of active GI bleed.

## 2018-07-24 NOTE — CONSULTATION
History of Present Illness





- Reason for Consult


Consult date: 07/24/18


end stage renal disease


Requesting physician: TRICIA TRACEY





- History of Present Illness





68 YO Female with HTN, CHF, GERD, ESRD on HD (M,W,F), HIV Disease, 

Diverticulosis presents to  ED for evaluation. Pt states that she has 

experienced rectal bleeding over the past 2 days, with an episode of large 

volume rectal bleeding today.  The patient states blood was just "pouring" out 

of her like someone had "turned on a faucet."  The patient complains of lower 

abdominal pain for the past 2 days as well as some rectal burning.  The patient 

states she began feeling weak and dizzy and this prompted her to come to the 

ED.  Patient states she feels more lightheaded when sitting up.  Patient denies 

any history of fever.  Patient states she was last dialyzed yesterday





- Past Medical History


Previous Medical History?: Yes


Hx Hypertension: Yes


Hx Congestive Heart Failure: Yes


Hx GERD: Yes


Hx Liver Disease: Yes (abnormal liver function test, coagulopathy, mass on liver

)


Hx Renal Disease: Yes (M, W, F)


Hx HIV: Yes





- Surgical History


Past Surgical History?: Yes


Additional Surgical History: PD catheter -removed.  FISTULA LEFT ARM





- Family History


Family history: no significant





- Social History


Smoking Status: Former Smoker (none 50 years)


Substance Use Type: None (denies illicit drug use)








Medications and Allergies


 Allergies











Allergy/AdvReac Type Severity Reaction Status Date / Time


 


codeine Allergy  HALLUCINATE Verified 07/05/18 17:25


 


morphine Allergy  hallucinati Verified 07/11/18 00:04





   on  


 


hydralazine AdvReac  Vomiting Verified 07/05/18 17:25











 Home Medications











 Medication  Instructions  Recorded  Confirmed  Last Taken  Type


 


Efavirenz [Sustiva] 600 mg PO DAILY 02/02/17 07/24/18 05/10/17 History


 


Terazosin [Hytrin] 10 mg PO BID 02/02/17 07/24/18 07/23/18 History


 


Tenofovir [Viread] 300 mg PO QWEEK 07/17/17 07/24/18 07/19/18 History


 


Fluticasone [Flonase] 1 spray NS QDAY #1 bottle 06/13/18 07/24/18 Unknown Rx


 


B Complex 11/Folic/C/Biot/Zinc 1 each PO QDAY 07/10/18 07/24/18 Unknown History





[Dialyvite with Zinc Tablet]     


 


lamiVUDine [Epivir] 150 mg PO QPM 07/10/18 07/24/18 07/23/18 History


 


Minoxidil [Loniten] 10 mg PO BID #60 tablet 07/13/18 07/24/18 07/23/18 Rx


 


Calcium Acetate [Phoslo] 1,334 mg PO TIDWM 07/24/18 07/24/18 Unknown History


 


cloNIDine [Catapres] 0.2 mg PO QID 07/24/18 07/24/18 07/23/18 History











Active Meds: 


Active Medications





Acetaminophen (Tylenol)  650 mg PO Q4H PRN


   PRN Reason: Pain MILD(1-3)/Fever >100.5/HA


Albuterol (Proventil)  2.5 mg IH Q4HRT PRN


   PRN Reason: Shortness Of Breath


Clonidine HCl (Catapres)  0.3 mg PO TID JEFF


Clonidine HCl (Catapres-Tts Patch)  0.3 mg TD Tu [facility]


Efavirenz (Sustiva)  600 mg PO DAILY [facility]


Fluticasone Propionate (Flonase)  50 mcg NS QDAY JEFF


Lamivudine (Epivir)  150 mg PO QPM JEFF


Minoxidil (Loniten)  10 mg PO BID JEFF


Multivit/Ca Carb/B Cmplx/FA/Prenat (Renal Caps)  1 cap PO QDAY JEFF


Ondansetron HCl (Zofran)  4 mg IV Q8H PRN


   PRN Reason: Nausea And Vomiting


Pantoprazole Sodium (Protonix)  40 mg IV BID JEFF


Prazosin HCl (Minipress)  5 mg PO Q12HR JEFF


Sodium Chloride (Sodium Chloride Flush Syringe 10 Ml)  10 ml IV BID JEFF


Sodium Chloride (Sodium Chloride Flush Syringe 10 Ml)  10 ml IV PRN PRN


   PRN Reason: LINE FLUSH


Tenofovir Disoproxil Fumarate (Viread)  300 mg PO Th [facility]











Review of Systems


Constitutional: fatigue, weakness, malaise


Gastrointestinal: nausea, BRBPR





Exam





- Vital Signs


Vital signs: 


 Vital Signs











Temp Pulse Resp BP Pulse Ox


 


 98.6 F   71   20   124/49   100 


 


 07/24/18 10:43  07/24/18 10:43  07/24/18 10:43  07/24/18 10:43  07/24/18 10:43














- Physical Exam


Narrative exam: 





General appearance: well-developed, well-nourished, appears stated age


EENT: PERRL, mucous membranes moist


Neck: Present: neck supple, trachea midline.  Absent: JVD/HJR, Masses


Respiratory: Clear to Ascultation


Heart: regular, normal heart rate


Gastrointestinal: Present: normal, normoactive bowel sounds


Integumentary: no rash, other (1+ edema.  AV fistula in her left upper arm.  

Good bruit and thrill)








Results





- Lab Results





 07/24/18 11:09





 07/24/18 11:09


 Most recent lab results











Calcium  7.7 mg/dL (8.4-10.2)  L  07/24/18  11:09    














Assessment and Plan





Impression


* End-stage renal disease


* Symptomatic anemia of abl poa


* gi bleeding


* Hypertension


* HIV disease





Recommendations


* prn packed RBC transfusion--rec 2 units today


* Continue dialysis on MWF schedule as outpatient


* No IV, BP or of any puncture in access arm


* no heparin with hd, epogen with hd


* Further plansas per  GI services 


* Hold PO4 binders for now 


* adjust diet and meds for  ESRD state

## 2018-07-24 NOTE — EMERGENCY DEPARTMENT REPORT
HPI





- General


Chief Complaint: GI Bleed


Time Seen by Provider: 07/24/18 11:37





- HPI


HPI: 





Room 10





The patient is a 67-year-old female presenting with a chief complaint of rectal 

bleeding.  The patient is status post polypectomy after colonoscopy that 

revealed multiple colonic polyps and extensive diverticulosis of the left colon 

07/12/2018 by Dr. Siegel.  The patient states she began having light rectal 

bleeding 2 days ago and yesterday however this morning the patient states blood 

was just "pouring" out of her visit someone had "turned on a faucet."  The 

patient complains of lower abdominal pain for the past 2 days as well as some 

rectal burning.  The patient states she began feeling weak and dizzy and this 

prompted her to come to the ED.  Patient states she feels more lightheaded when 

sitting up.  Patient denies any history of fever.  Patient states she was last 

dialyzed yesterday





Location: [See above]


Duration: [See above]


Quality: Pain


Severity: Moderate


Modifying factors: [see above]


Context: [see above]


Mode of transportation: [not driving]





ED Past Medical Hx





- Past Medical History


Previous Medical History?: Yes


Hx Hypertension: Yes


Hx Congestive Heart Failure: Yes


Hx GERD: Yes


Hx Liver Disease: Yes (abnormal liver function test, coagulopathy, mass on liver

)


Hx Renal Disease: Yes (M, W, F)


Hx HIV: Yes





- Surgical History


Past Surgical History?: Yes


Additional Surgical History: PD catheter -removed.  FISTULA LEFT ARM





- Family History


Family history: no significant





- Social History


Smoking Status: Former Smoker (none 50 years)


Substance Use Type: None (denies illicit drug use)





- Medications


Home Medications: 


 Home Medications











 Medication  Instructions  Recorded  Confirmed  Last Taken  Type


 


Efavirenz [Sustiva] 600 mg PO DAILY 02/02/17 07/10/18 05/10/17 History


 


Minoxidil [Loniten] 10 mg PO QDAY 02/02/17 07/10/18 05/10/17 History


 


Terazosin [Hytrin] 10 mg PO BID 02/02/17 07/10/18 05/10/17 History


 


Tenofovir [Viread] 300 mg PO QWEEK 07/17/17 07/10/18 Unknown History


 


ALBUTEROL Inhaler [ProAir HFA 2 puff IH QID PRN #1 inhalation 06/13/18 07/10/18 

Unknown Rx





Inhaler]     


 


Fluticasone [Flonase] 1 spray NS QDAY #1 bottle 06/13/18 07/10/18 Unknown Rx


 


B Complex 11/Folic/C/Biot/Zinc 1 each PO QDAY 07/10/18 07/10/18 Unknown History





[Dialyvite with Zinc Tablet]     


 


lamiVUDine [Epivir] 150 mg PO QPM 07/10/18 07/10/18 Unknown History


 


ALBUTEROL NEB's [Proventil 0.083% 2.5 mg IH Q4HRT PRN  nebu 07/13/18  Unknown Rx





NEBS]     


 


Labetalol [Normodyne TAB] 200 mg PO TID #90 tablet 07/13/18  Unknown Rx


 


Minoxidil [Loniten] 10 mg PO BID #60 tablet 07/13/18  Unknown Rx


 


cloNIDine [Catapres] 0.3 mg PO TID #90 tablet 07/13/18  Unknown Rx


 


cloNIDine-TTS PATCH [Catapres-Tts 0.3 mg TD Tu  patch 07/13/18  Unknown Rx





Patch]     














ED Review of Systems


ROS: 


Stated complaint: RETURN BLEEDING


Other details as noted in HPI





Constitutional: denies: fever


Eyes: denies: eye pain


ENT: denies: throat pain


Respiratory: no symptoms reported


Cardiovascular: denies: chest pain


Endocrine: no symptoms reported


Gastrointestinal: abdominal pain, hematochezia.  denies: nausea


Musculoskeletal: denies: back pain


Neurological: other (lightheadedness).  denies: headache





Physical Exam





- Physical Exam


Vital Signs: 


 Vital Signs











  07/24/18





  10:43


 


Temperature 98.6 F


 


Pulse Rate 71


 


Respiratory 20





Rate 


 


Blood Pressure 124/49


 


O2 Sat by Pulse 100





Oximetry 











Physical Exam: 





GENERAL: The patient is well-developed well-nourished female sitting on 

stretcher not appearing to be in acute distress. []


HEENT: Normocephalic.  Atraumatic.  Extraocular motions are intact.  Patient 

has moist mucous membranes.


NECK: Supple.  Trachea midline


CHEST/LUNGS: Clear to auscultation.  There is no respiratory distress noted.


HEART/CARDIOVASCULAR: Regular.  There is no tachycardia.  There is no gallop 

rub or murmur.


ABDOMEN: Abdomen is soft, with discomfort to palpation in the lower abdomen.  

Patient has normal bowel sounds.  There is no abdominal distention.


SKIN: There is no rash.  There is no edema.  There is no diaphoresis.


NEURO: The patient is awake, alert, and oriented.  The patient is cooperative.  

The patient has normal speech


MUSCULOSKELETAL:There is no evidence of acute injury.





ED Course


 Vital Signs











  07/24/18





  10:43


 


Temperature 98.6 F


 


Pulse Rate 71


 


Respiratory 20





Rate 


 


Blood Pressure 124/49


 


O2 Sat by Pulse 100





Oximetry 














- Consultations


Consultation #1: 





07/24/18 12:13


GI paged


07/24/18 12:45


Case discussed with Dr. Ojeda





ED Medical Decision Making





- Lab Data


Result diagrams: 


 07/24/18 11:09





 07/24/18 11:09





- EKG Data


-: EKG Interpreted by Me


EKG shows normal: sinus rhythm


Rate: normal





- EKG Data


When compared to previous EKG there are: no significant change


Interpretation: unchanged when compared t (07/17/2017)





- Differential Diagnosis


GI bleed


Critical care attestation.: 


If time is entered above; I have spent that time in minutes in the direct care 

of this critically ill patient, excluding procedure time.








ED Disposition


Clinical Impression: 


 GI bleed, Symptomatic anemia, ESRD (end stage renal disease)





Disposition: DC-09 OP ADMIT IP TO THIS HOSP


Is pt being admited?: Yes


Does the pt Need Aspirin: No


Condition: Fair


Referrals: 


PRIMARY CARE,MD [Primary Care Provider] - 3-5 Days


Forms:  Accompanied Note


Time of Disposition: 12:14 (Hospitalist notified (Dr Jeffries))

## 2018-07-25 LAB
BASOPHILS # (AUTO): 0.1 K/MM3 (ref 0–0.1)
BASOPHILS NFR BLD AUTO: 1.2 % (ref 0–1.8)
EOSINOPHIL # BLD AUTO: 0 K/MM3 (ref 0–0.4)
EOSINOPHIL NFR BLD AUTO: 0.6 % (ref 0–4.3)
HCT VFR BLD CALC: 29.9 % (ref 30.3–42.9)
HGB BLD-MCNC: 9.7 GM/DL (ref 10.1–14.3)
LYMPHOCYTES # BLD AUTO: 0.7 K/MM3 (ref 1.2–5.4)
LYMPHOCYTES NFR BLD AUTO: 7.9 % (ref 13.4–35)
MCH RBC QN AUTO: 29 PG (ref 28–32)
MCHC RBC AUTO-ENTMCNC: 33 % (ref 30–34)
MCV RBC AUTO: 91 FL (ref 79–97)
MONOCYTES # (AUTO): 0.4 K/MM3 (ref 0–0.8)
MONOCYTES % (AUTO): 4.8 % (ref 0–7.3)
PLATELET # BLD: 123 K/MM3 (ref 140–440)
RBC # BLD AUTO: 3.3 M/MM3 (ref 3.65–5.03)

## 2018-07-25 PROCEDURE — 5A1D70Z PERFORMANCE OF URINARY FILTRATION, INTERMITTENT, LESS THAN 6 HOURS PER DAY: ICD-10-PCS | Performed by: INTERNAL MEDICINE

## 2018-07-25 RX ADMIN — MINOXIDIL SCH: 10 TABLET ORAL at 11:25

## 2018-07-25 RX ADMIN — MINOXIDIL SCH MG: 10 TABLET ORAL at 22:26

## 2018-07-25 RX ADMIN — CLONIDINE HYDROCHLORIDE SCH MG: 0.1 TABLET ORAL at 00:57

## 2018-07-25 RX ADMIN — PANTOPRAZOLE SODIUM SCH: 40 INJECTION, POWDER, FOR SOLUTION INTRAVENOUS at 11:25

## 2018-07-25 RX ADMIN — FLUTICASONE PROPIONATE SCH: 50 SPRAY, METERED NASAL at 11:25

## 2018-07-25 RX ADMIN — IPRATROPIUM BROMIDE AND ALBUTEROL SULFATE SCH AMPUL: .5; 3 SOLUTION RESPIRATORY (INHALATION) at 19:23

## 2018-07-25 RX ADMIN — IPRATROPIUM BROMIDE AND ALBUTEROL SULFATE SCH AMPUL: .5; 3 SOLUTION RESPIRATORY (INHALATION) at 14:36

## 2018-07-25 RX ADMIN — PRAZOSIN HYDROCHLORIDE SCH: 5 CAPSULE ORAL at 11:25

## 2018-07-25 RX ADMIN — PRAZOSIN HYDROCHLORIDE SCH MG: 5 CAPSULE ORAL at 22:26

## 2018-07-25 RX ADMIN — CLONIDINE HYDROCHLORIDE SCH MG: 0.1 TABLET ORAL at 22:33

## 2018-07-25 RX ADMIN — CLONIDINE HYDROCHLORIDE SCH: 0.1 TABLET ORAL at 08:30

## 2018-07-25 RX ADMIN — PRAZOSIN HYDROCHLORIDE SCH MG: 5 CAPSULE ORAL at 00:58

## 2018-07-25 RX ADMIN — NEPHROCAP SCH: 1 CAP ORAL at 11:25

## 2018-07-25 RX ADMIN — PANTOPRAZOLE SODIUM SCH MG: 40 INJECTION, POWDER, FOR SOLUTION INTRAVENOUS at 22:26

## 2018-07-25 RX ADMIN — HYDROMORPHONE HYDROCHLORIDE PRN MG: 1 INJECTION, SOLUTION INTRAMUSCULAR; INTRAVENOUS; SUBCUTANEOUS at 16:16

## 2018-07-25 RX ADMIN — PANTOPRAZOLE SODIUM SCH MG: 40 INJECTION, POWDER, FOR SOLUTION INTRAVENOUS at 00:58

## 2018-07-25 RX ADMIN — LAMIVUDINE SCH: 150 TABLET, FILM COATED ORAL at 21:57

## 2018-07-25 RX ADMIN — Medication SCH ML: at 22:27

## 2018-07-25 RX ADMIN — EFAVIRENZ SCH: 200 CAPSULE ORAL at 11:26

## 2018-07-25 RX ADMIN — CLONIDINE HYDROCHLORIDE SCH MG: 0.1 TABLET ORAL at 15:07

## 2018-07-25 RX ADMIN — MINOXIDIL SCH MG: 10 TABLET ORAL at 00:58

## 2018-07-25 RX ADMIN — Medication SCH ML: at 00:58

## 2018-07-25 RX ADMIN — Medication SCH: at 11:26

## 2018-07-25 RX ADMIN — LAMIVUDINE SCH MG: 150 TABLET, FILM COATED ORAL at 19:30

## 2018-07-25 NOTE — CONSULTATION
Referring Physician: DUSTIN GRAHAM MD



INDICATION:  GI bleed.



HISTORY OF PRESENT ILLNESS:  The patient is a 67-year-old female with history of

diabetes, hypertension, CHF, end-stage renal disease, on dialysis as well as

HIV, diverticulosis.  The patient recently status post colonoscopy 2 weeks ago

where she had polyps removed.  The patient reports bright red and a large amount

of bloody stools over the last 2 days.  She reports no nausea, vomiting.  She

reports mild lower abdominal cramping.  She denies any other specific GI

complaints.  The patient subsequently came to Emergency Room, was seen and

evaluated and admitted.



PAST MEDICAL HISTORY:

1. End-stage renal disease, on dialysis.

2. GERD.

3. Heart failure.

4. HIV.

5. Hypertension.



PAST SURGICAL HISTORY:  Status post appendectomy, status post AV fistula.



MEDICATIONS:  See chart.  Full chart was reviewed and updated on review.



ALLERGIES:  CODEINE, MORPHINE, AND HYDRALAZINE.



SOCIAL HISTORY:  Denies alcohol or tobacco.



FAMILY HISTORY:  Negative for colon cancer.



REVIEW OF SYSTEMS:

GENERAL:  Reports mild weakness.

HEENT:  No visual complaints or tinnitus.

PULMONARY:  No shortness of breath.  No cough.  No chest pain.

GASTROINTESTINAL:  Reports lower GI bleed.

All points of 13-point review of systems otherwise negative.



PHYSICAL EXAMINATION:

VITAL SIGNS:  Temperature of 98.7, pulse 77, respiration 18, blood pressure

152/56.

GENERAL:  Fairly nourished female in no acute distress.

HEENT:  Pupils equal, round and reactive.

PULMONARY:  Clear.

CARDIOVASCULAR:  Regular rhythm.  Normal S1, S2.

ABDOMEN:  Positive bowel sounds, soft.

SKIN:  No rashes.



LABORATORY DATA:  Labs pertinent for white count 9.5, hemoglobin and hematocrit

6.1 and 20.2, platelet count of 147.  Chem-7 within normal limits except for BUN

and creatinine of 22 and 4.1.  LFTs within normal limits.



ASSESSMENT:  A 67-year-old female with a history of diverticular bleed in past,

multiple medical problems, recently had a colonoscopy with polyps removed, now

presents of signs of lower GI bleed.  Possible diverticular bleed versus

post-polypectomy versus other.



PLAN:

1. Clear liquid diet, n.p.o. after midnight.

2. Follow hematocrit and transfuse as needed.

3. Plan colonoscopy in a.m.





DD: 07/24/2018 18:25

DT: 07/25/2018 01:24

JOB# [id number]  [id number]

ADELA/BIGG PANIAGUA

## 2018-07-25 NOTE — PROGRESS NOTE
Assessment and Plan





Impression


* End-stage renal disease


* Symptomatic anemia of abl poa


* gi bleeding


* Hypertension


* HIV disease





Recommendations


* prn packed RBC transfusion--s/p 2 units prbcs


* Continue dialysis on MWF schedule


* No IV, BP or of any puncture in access arm


* no heparin with hd, epogen with hd


* Further plansas per  GI services 


* Hold PO4 binders for now 


* adjust diet and meds for  ESRD state








Subjective


Date of service: 07/25/18


Principal diagnosis: esrd


Interval history: 





resting in bed today





Objective





- Exam


Narrative Exam: 





General appearance: well-developed, well-nourished, appears stated age


EENT: PERRL, mucous membranes moist


Neck: Present: neck supple, trachea midline.  Absent: JVD/HJR, Masses


Respiratory: Clear to Ascultation


Heart: regular, normal heart rate


Gastrointestinal: Present: normal, normoactive bowel sounds


Integumentary: no rash, other (1+ edema.  AV fistula in her left upper arm.  

Good bruit and thrill)








- Vital Signs


Vital signs: 


 Vital Signs - 12hr











  07/24/18 07/24/18 07/24/18





  21:45 22:00 22:15


 


Temperature   


 


Pulse Rate   


 


Respiratory 15 19 15





Rate   


 


Blood Pressure 187/59 179/74 166/72


 


O2 Sat by Pulse   





Oximetry   














  07/24/18 07/24/18 07/24/18





  22:20 22:30 22:45


 


Temperature 98.9 F  


 


Pulse Rate 86  


 


Respiratory 19 16 14





Rate   


 


Blood Pressure 181/75 187/76 181/75


 


O2 Sat by Pulse 100  





Oximetry   














  07/24/18 07/24/18 07/24/18





  22:50 23:00 23:05


 


Temperature 98.9 F  99.0 F


 


Pulse Rate 67  68


 


Respiratory 18 16 18





Rate   


 


Blood Pressure 176/74 176/76 186/76


 


O2 Sat by Pulse 99  97





Oximetry   














  07/24/18 07/24/18 07/24/18





  23:15 23:30 23:35


 


Temperature   98.9 F


 


Pulse Rate   70


 


Respiratory 17 16 18





Rate   


 


Blood Pressure 192/75 190/73 175/74


 


O2 Sat by Pulse   100





Oximetry   














  07/24/18 07/25/18 07/25/18





  23:45 00:00 00:16


 


Temperature   


 


Pulse Rate   


 


Respiratory 19 16 17





Rate   


 


Blood Pressure 188/72 187/75 201/76


 


O2 Sat by Pulse  98 





Oximetry   














  07/25/18 07/25/18 07/25/18





  00:30 01:20 01:56


 


Temperature  99.0 F 


 


Pulse Rate   70


 


Respiratory 12  





Rate   


 


Blood Pressure 201/76  


 


O2 Sat by Pulse   





Oximetry   














  07/25/18 07/25/18 07/25/18





  02:29 07:59 08:57


 


Temperature 98.7 F 99.2 F 


 


Pulse Rate 86 104 H 


 


Respiratory 18 24 





Rate   


 


Blood Pressure 139/60 143/68 


 


O2 Sat by Pulse 96 97 97





Oximetry   














- Lab





 07/25/18 05:43





 07/24/18 11:09


 Most recent lab results











Calcium  7.7 mg/dL (8.4-10.2)  L  07/24/18  11:09

## 2018-07-25 NOTE — PROGRESS NOTE
Assessment and Plan


Assessment and plan: 


68 YO Female with HTN, Diastolic CHF, GERD, ESRD on HD (M,W,F), HIV Disease, 

Diverticulosis presents to  ED for evaluation. Pt states that she has 

experienced rectal bleeding over the past 2 days, with an episode of large 

volume rectal bleeding today.  The patient states blood was just "pouring" out 

of her like someone had "turned on a faucet."  The patient complains of lower 

abdominal pain for the past 2 days as well as some rectal burning.  The patient 

states she began feeling weak and dizzy and this prompted her to come to the 

ED.  Patient states she feels more lightheaded when sitting up.  Patient denies 

fever, chills, CP, Palpitations, NVD, Trauma, unintentional weight loss, night 

sweats, or recent ill contacts.  Patient states she was last dialyzed 

yesterday. Nephrology consulted in ED. GI consulted in ED. 


Patient was recently in the hospital and underwent colonoscopy with snare 

polypectomy, there was also noted multiple diverticulosis coli





GI bleed-Rectal. 


ESRD


Severe Symptomatic Anemia secondary to GI bleed


HIV


HTN


Diverticulosis Coli


Secondary coagulopathy-?etiology


CHF-STABLE





Plan


supportive care


Pain control with hydromorphone


GI eval today


S/P 2 unit PRBC with good response


Avoid antiplatelets


Continue anti-retroviral


Outpatient hematology eval for further evaluation


Tagged RBC scan was negative 


Continue dialysis per nephrology MWF


DVT/GI PROPHY


Plan discussed with the patient in detail patient verbalized understanding.











History


Interval history: 


Patient seen and examined to a dialysis patient reports she still in chronic 

pain and only diludid works for her.  She denies any other complaints at this 

time reports some improvement of her generalized weakness.








Hospitalist Physical





- Physical exam


Narrative exam: 


 VITAL SIGNS:  Reviewed.    


GENERAL:  The patient appeared chronically ill. Vital signs as documented.


HEAD:  No signs of head trauma.  Temporal wasting


EYES:  Pupils are equal.  Extraocular motions intact.  


EARS:  Hearing grossly intact.


MOUTH:  Oropharynx is normal. 


NECK:  No adenopathy, no JVD.   


CHEST:  Chest with clear breath sounds bilaterally.  No wheezes, rales, or 

rhonchi.  


CARDIAC:  Regular rate and rhythm.  S1 and S2, without murmurs, gallops, or 

rubs.


VASCULAR:  Fistula on the left arm with palpable chills No Edema.  Peripheral 

pulses normal and equal in all extremities.


ABDOMEN:  Soft, without detectable tenderness.  No sign of distention.  No   

rebound or guarding, and no masses palpated.   Bowel Sounds normal.


MUSCULOSKELETAL:  Good range of motion of all major joints. Extremities without 

clubbing, cyanosis or edema.  


NEUROLOGIC EXAM:  Alert and oriented x 3.  No focal sensory or strength 

deficits.   Speech normal.  Follows commands.


PSYCHIATRIC:  Mood normal.


SKIN:  No rash or lesions.














- Constitutional


Vitals: 


 











Temp Pulse Resp BP Pulse Ox


 


 98.7 F   86   18   139/60   96 


 


 07/25/18 02:29  07/25/18 02:29  07/25/18 02:29  07/25/18 02:29 07/25/18 02:29











General appearance: Present: no acute distress, well-nourished





Results





- Labs


CBC & Chem 7: 


 07/25/18 05:43





 07/24/18 11:09


Labs: 


 Laboratory Last Values











WBC  8.4 K/mm3 (4.5-11.0)   07/25/18  05:43    


 


RBC  3.30 M/mm3 (3.65-5.03)  L  07/25/18  05:43    


 


Hgb  9.7 gm/dl (10.1-14.3)  L D 07/25/18  05:43    


 


Hct  29.9 % (30.3-42.9)  L D 07/25/18  05:43    


 


MCV  91 fl (79-97)   07/25/18  05:43    


 


MCH  29 pg (28-32)   07/25/18  05:43    


 


MCHC  33 % (30-34)   07/25/18  05:43    


 


RDW  16.3 % (13.2-15.2)  H  07/25/18  05:43    


 


Plt Count  123 K/mm3 (140-440)  L  07/25/18  05:43    


 


Lymph % (Auto)  7.9 % (13.4-35.0)  L  07/25/18  05:43    


 


Mono % (Auto)  4.8 % (0.0-7.3)   07/25/18  05:43    


 


Eos % (Auto)  0.6 % (0.0-4.3)   07/25/18  05:43    


 


Baso % (Auto)  1.2 % (0.0-1.8)   07/25/18  05:43    


 


Lymph #  0.7 K/mm3 (1.2-5.4)  L  07/25/18  05:43    


 


Mono #  0.4 K/mm3 (0.0-0.8)   07/25/18  05:43    


 


Eos #  0.0 K/mm3 (0.0-0.4)   07/25/18  05:43    


 


Baso #  0.1 K/mm3 (0.0-0.1)   07/25/18  05:43    


 


Seg Neutrophils %  85.5 % (40.0-70.0)  H  07/25/18  05:43    


 


Seg Neutrophils #  7.2 K/mm3 (1.8-7.7)   07/25/18  05:43    


 


PT  20.7 Sec. (12.2-14.9)  H  07/24/18  11:09    


 


INR  1.67  (0.87-1.13)  H  07/24/18  11:09    


 


APTT  29.5 Sec. (24.2-36.6)   07/24/18  11:09    


 


Sodium  137 mmol/L (137-145)   07/24/18  11:09    


 


Potassium  4.1 mmol/L (3.6-5.0)   07/24/18  11:09    


 


Chloride  93.7 mmol/L ()  L  07/24/18  11:09    


 


Carbon Dioxide  29 mmol/L (22-30)   07/24/18  11:09    


 


Anion Gap  18 mmol/L  07/24/18  11:09    


 


BUN  22 mg/dL (7-17)  H  07/24/18  11:09    


 


Creatinine  4.1 mg/dL (0.7-1.2)  H  07/24/18  11:09    


 


Estimated GFR  13 ml/min  07/24/18  11:09    


 


BUN/Creatinine Ratio  5 %  07/24/18  11:09    


 


Glucose  102 mg/dL ()  H  07/24/18  11:09    


 


Calcium  7.7 mg/dL (8.4-10.2)  L  07/24/18  11:09    


 


Total Bilirubin  0.30 mg/dL (0.1-1.2)   07/24/18  11:09    


 


AST  18 units/L (5-40)   07/24/18  11:09    


 


ALT  8 units/L (7-56)   07/24/18  11:09    


 


Alkaline Phosphatase  121 units/L ()   07/24/18  11:09    


 


Total Protein  5.1 g/dL (6.3-8.2)  L  07/24/18  11:09    


 


Albumin  2.4 g/dL (3.9-5)  L  07/24/18  11:09    


 


Albumin/Globulin Ratio  0.9 %  07/24/18  11:09    


 


Lipase  14 units/L (13-60)   07/24/18  11:09    


 


Blood Type  B POSITIVE   07/24/18  11:09    


 


Antibody Screen  Negative   07/24/18  11:09    


 


Crossmatch  See Detail   07/24/18  11:09

## 2018-07-25 NOTE — GASTROENTEROLOGY PROGRESS NOTE
Assessment and Plan


GI: no further signs bleeding


- start po


- hold colonoscopy


- if h/h stable in am ok to d/c 


- will follow








Subjective


Date of service: 07/25/18


Principal diagnosis: esrd


Interval history: 


- pt w/o further signs bleeding overnight. Reports unable to complete prep








Objective





- Constitutional


Vitals: 


 











Temp Pulse Resp BP Pulse Ox


 


 97.8 F   102 H  18   175/72   97 


 


 07/25/18 12:20  07/25/18 12:20  07/25/18 12:20  07/25/18 12:20  07/25/18 08:57











General appearance: no acute distress





- EENT


Eyes: PERRL





- Respiratory


Respiratory: bilateral: CTA





- Cardiovascular


Rhythm: regular


Heart Sounds: Present: S1 & S2





- Gastrointestinal


General gastrointestinal: Present: soft, non-tender, non-distended





- Labs


CBC & Chem 7: 


 07/25/18 05:43





 07/24/18 11:09


Labs: 


 Laboratory Results - last 24 hr











  07/24/18 07/25/18





  11:09 05:43


 


WBC   8.4


 


RBC   3.30 L


 


Hgb   9.7 L D


 


Hct   29.9 L D


 


MCV   91


 


MCH   29


 


MCHC   33


 


RDW   16.3 H


 


Plt Count   123 L


 


Lymph % (Auto)   7.9 L


 


Mono % (Auto)   4.8


 


Eos % (Auto)   0.6


 


Baso % (Auto)   1.2


 


Lymph #   0.7 L


 


Mono #   0.4


 


Eos #   0.0


 


Baso #   0.1


 


Seg Neutrophils %   85.5 H


 


Seg Neutrophils #   7.2


 


Blood Type  B POSITIVE 


 


Antibody Screen  Negative 


 


Crossmatch  See Detail

## 2018-07-25 NOTE — XRAY REPORT
Portable chest:



Persistent cough.



The cardiac contour is enlarged. There is slight redistribution of flow 

to the upper lobes. These findings are unchanged compared to prior exam 

of May 12, 2017. The lungs are clear. No nodules and no infiltrates.



Impression:



Cardiomegaly with evidence of chronic mild cardiac decompensation.

## 2018-07-26 LAB
BUN SERPL-MCNC: 13 MG/DL (ref 7–17)
BUN/CREAT SERPL: 4 %
CALCIUM SERPL-MCNC: 8.2 MG/DL (ref 8.4–10.2)
HCT VFR BLD CALC: 28.5 % (ref 30.3–42.9)
HEMOLYSIS INDEX: 66
HGB BLD-MCNC: 9.4 GM/DL (ref 10.1–14.3)
MCH RBC QN AUTO: 29 PG (ref 28–32)
MCHC RBC AUTO-ENTMCNC: 33 % (ref 30–34)
MCV RBC AUTO: 88 FL (ref 79–97)
PLATELET # BLD: 125 K/MM3 (ref 140–440)
RBC # BLD AUTO: 3.23 M/MM3 (ref 3.65–5.03)

## 2018-07-26 RX ADMIN — FLUTICASONE PROPIONATE SCH MCG: 50 SPRAY, METERED NASAL at 15:23

## 2018-07-26 RX ADMIN — HYDROMORPHONE HYDROCHLORIDE PRN MG: 1 INJECTION, SOLUTION INTRAMUSCULAR; INTRAVENOUS; SUBCUTANEOUS at 14:15

## 2018-07-26 RX ADMIN — PRAZOSIN HYDROCHLORIDE SCH MG: 5 CAPSULE ORAL at 21:40

## 2018-07-26 RX ADMIN — MINOXIDIL SCH MG: 10 TABLET ORAL at 09:28

## 2018-07-26 RX ADMIN — IPRATROPIUM BROMIDE AND ALBUTEROL SULFATE SCH AMPUL: .5; 3 SOLUTION RESPIRATORY (INHALATION) at 09:30

## 2018-07-26 RX ADMIN — CLONIDINE HYDROCHLORIDE SCH MG: 0.1 TABLET ORAL at 09:27

## 2018-07-26 RX ADMIN — CLONIDINE HYDROCHLORIDE SCH MG: 0.1 TABLET ORAL at 14:09

## 2018-07-26 RX ADMIN — IPRATROPIUM BROMIDE AND ALBUTEROL SULFATE SCH: .5; 3 SOLUTION RESPIRATORY (INHALATION) at 04:47

## 2018-07-26 RX ADMIN — PANTOPRAZOLE SODIUM SCH MG: 40 INJECTION, POWDER, FOR SOLUTION INTRAVENOUS at 09:27

## 2018-07-26 RX ADMIN — HYDROMORPHONE HYDROCHLORIDE PRN MG: 1 INJECTION, SOLUTION INTRAMUSCULAR; INTRAVENOUS; SUBCUTANEOUS at 09:26

## 2018-07-26 RX ADMIN — CLONIDINE HYDROCHLORIDE SCH MG: 0.1 TABLET ORAL at 21:39

## 2018-07-26 RX ADMIN — Medication SCH ML: at 21:42

## 2018-07-26 RX ADMIN — EFAVIRENZ SCH MG: 200 CAPSULE ORAL at 09:28

## 2018-07-26 RX ADMIN — NEPHROCAP SCH CAP: 1 CAP ORAL at 09:27

## 2018-07-26 RX ADMIN — Medication SCH ML: at 09:17

## 2018-07-26 RX ADMIN — LAMIVUDINE SCH MG: 150 TABLET, FILM COATED ORAL at 17:12

## 2018-07-26 RX ADMIN — PRAZOSIN HYDROCHLORIDE SCH MG: 5 CAPSULE ORAL at 09:27

## 2018-07-26 RX ADMIN — MINOXIDIL SCH MG: 10 TABLET ORAL at 21:40

## 2018-07-26 RX ADMIN — HYDROMORPHONE HYDROCHLORIDE PRN MG: 1 INJECTION, SOLUTION INTRAMUSCULAR; INTRAVENOUS; SUBCUTANEOUS at 05:08

## 2018-07-26 RX ADMIN — HYDROMORPHONE HYDROCHLORIDE PRN MG: 1 INJECTION, SOLUTION INTRAMUSCULAR; INTRAVENOUS; SUBCUTANEOUS at 21:41

## 2018-07-26 NOTE — PROGRESS NOTE
Assessment and Plan





Impression


* End-stage renal disease


* Symptomatic anemia of abl poa


* gi bleeding


* Hypertension


* HIV disease





Recommendations


* prn packed RBC transfusion--s/p 2 units prbcs


* Continue dialysis on MWF schedule


* No IV, BP or of any puncture in access arm


* no heparin with hd, epogen with hd


* Further plans per  GI services 


* Hold PO4 binders for now 


* adjust diet and meds for  ESRD state








Subjective


Date of service: 07/26/18


Principal diagnosis: esrd


Interval history: 





resting in bed today





Objective





- Exam


Narrative Exam: 





General appearance: well-developed, well-nourished, appears stated age


EENT: PERRL, mucous membranes moist


Neck: Present: neck supple, trachea midline.  Absent: JVD/HJR, Masses


Respiratory: Clear to Ascultation


Heart: regular, normal heart rate


Gastrointestinal: Present: normal, normoactive bowel sounds


Integumentary: no rash, other (1+ edema.  AV fistula in her left upper arm.  

Good bruit and thrill)








- Vital Signs


Vital signs: 


 Vital Signs - 12hr











  07/26/18





  03:05


 


Temperature 99.3 F


 


Pulse Rate 114 H


 


Respiratory 20





Rate 


 


Blood Pressure 143/67


 


O2 Sat by Pulse 95





Oximetry 














- Lab





 07/26/18 05:28





 07/26/18 05:28


 Most recent lab results











Calcium  8.2 mg/dL (8.4-10.2)  L  07/26/18  05:28

## 2018-07-26 NOTE — GASTROENTEROLOGY PROGRESS NOTE
Assessment and Plan


GI: no further signs bleeding


- diet as tolerated


- follow h/h


- ok to d/c in am if stable


- will sign off, call if needed








Subjective


Date of service: 07/26/18


Principal diagnosis: esrd


Interval history: 


- no signs bleeding overnight








Objective





- Constitutional


Vitals: 


 











Temp Pulse Resp BP Pulse Ox


 


 98.8 F   119 H  18   156/76   94 


 


 07/26/18 14:57  07/26/18 14:57  07/26/18 14:57  07/26/18 14:57  07/26/18 14:57











General appearance: no acute distress





- EENT


Eyes: PERRL





- Respiratory


Respiratory: bilateral: CTA





- Cardiovascular


Rhythm: regular


Heart Sounds: Present: S1 & S2





- Gastrointestinal


General gastrointestinal: Present: soft, non-tender, non-distended





- Labs


CBC & Chem 7: 


 07/26/18 05:28





 07/26/18 05:28


Labs: 


 Laboratory Results - last 24 hr











  07/26/18 07/26/18





  05:28 05:28


 


WBC  7.1 


 


RBC  3.23 L 


 


Hgb  9.4 L 


 


Hct  28.5 L 


 


MCV  88 


 


MCH  29 


 


MCHC  33 


 


RDW  16.6 H 


 


Plt Count  125 L 


 


Sodium   137


 


Potassium   4.0


 


Chloride   96.6 L


 


Carbon Dioxide   26


 


Anion Gap   18


 


BUN   13


 


Creatinine   3.7 H


 


Estimated GFR   15


 


BUN/Creatinine Ratio   4


 


Glucose   106 H


 


Calcium   8.2 L

## 2018-07-26 NOTE — PROGRESS NOTE
Assessment and Plan


Assessment and plan: 


66 YO Female with HTN, Diastolic CHF, GERD, ESRD on HD (M,W,F), HIV Disease, 

Diverticulosis presents to  ED for evaluation. Pt states that she has 

experienced rectal bleeding over the past 2 days, with an episode of large 

volume rectal bleeding today.  The patient states blood was just "pouring" out 

of her like someone had "turned on a faucet."  The patient complains of lower 

abdominal pain for the past 2 days as well as some rectal burning.  The patient 

states she began feeling weak and dizzy and this prompted her to come to the 

ED.  Patient states she feels more lightheaded when sitting up.  Patient denies 

fever, chills, CP, Palpitations, NVD, Trauma, unintentional weight loss, night 

sweats, or recent ill contacts.  Patient states she was last dialyzed 

yesterday. Nephrology consulted in ED. GI consulted in ED. 


Patient was recently in the hospital and underwent colonoscopy with snare 

polypectomy, there was also noted multiple diverticulosis coli





GI bleed-Rectal. 


ESRD


Severe Symptomatic Anemia secondary to GI bleed


HIV


HTN


Diverticulosis Coli


Secondary coagulopathy-?etiology


CHF-STABLE





Plan


supportive care


No further bleeding noted. GI input appreciated.  Follow-up outpatient


Check H&H in a.m.


Pain control with hydromorphone


S/P 2 unit PRBC with good response


Avoid antiplatelets


Continue anti-retroviral


Outpatient hematology eval for further evaluation


Tagged RBC scan was negative 


Continue dialysis per nephrology MWF


DVT/GI PROPHY


Plan discussed with the patient in detail patient verbalized understanding.


Discharge in a.m.











History


Interval history: 


Patient seen and examined reports improvement in symptoms develop at her 

baseline.  Concerned about discharged today due to lack of support at home





Hospitalist Physical





- Physical exam


Narrative exam: 


 VITAL SIGNS:  Reviewed.    


GENERAL:  The patient appeared chronically ill. Vital signs as documented.


HEAD:  No signs of head trauma.  Temporal wasting


EYES:  Pupils are equal.  Extraocular motions intact.  


EARS:  Hearing grossly intact.


MOUTH:  Oropharynx is normal. 


NECK:  No adenopathy, no JVD.   


CHEST:  Chest with clear breath sounds bilaterally.  No wheezes, rales, or 

rhonchi.  


CARDIAC:  Regular rate and rhythm.  S1 and S2, without murmurs, gallops, or 

rubs.


VASCULAR:  Fistula on the left arm with palpable chills No Edema.  Peripheral 

pulses normal and equal in all extremities.


ABDOMEN:  Soft, without detectable tenderness.  No sign of distention.  No   

rebound or guarding, and no masses palpated.   Bowel Sounds normal.


MUSCULOSKELETAL:  Good range of motion of all major joints. Extremities without 

clubbing, cyanosis or edema.  


NEUROLOGIC EXAM:  Alert and oriented x 3.  No focal sensory or strength 

deficits.   Speech normal.  Follows commands.


PSYCHIATRIC:  Mood normal.


SKIN:  No rash or lesions.














- Constitutional


Vitals: 


 











Temp Pulse Resp BP Pulse Ox


 


 99.0 F   112 H  16   156/77   96 


 


 07/26/18 07:40  07/26/18 07:40  07/26/18 07:40  07/26/18 07:40  07/26/18 07:40











General appearance: Present: no acute distress, well-nourished





Results





- Labs


CBC & Chem 7: 


 07/26/18 05:28





 07/26/18 05:28


Labs: 


 Laboratory Last Values











WBC  7.1 K/mm3 (4.5-11.0)   07/26/18  05:28    


 


RBC  3.23 M/mm3 (3.65-5.03)  L  07/26/18  05:28    


 


Hgb  9.4 gm/dl (10.1-14.3)  L  07/26/18  05:28    


 


Hct  28.5 % (30.3-42.9)  L  07/26/18  05:28    


 


MCV  88 fl (79-97)   07/26/18  05:28    


 


MCH  29 pg (28-32)   07/26/18  05:28    


 


MCHC  33 % (30-34)   07/26/18  05:28    


 


RDW  16.6 % (13.2-15.2)  H  07/26/18  05:28    


 


Plt Count  125 K/mm3 (140-440)  L  07/26/18  05:28    


 


Lymph % (Auto)  7.9 % (13.4-35.0)  L  07/25/18  05:43    


 


Mono % (Auto)  4.8 % (0.0-7.3)   07/25/18  05:43    


 


Eos % (Auto)  0.6 % (0.0-4.3)   07/25/18  05:43    


 


Baso % (Auto)  1.2 % (0.0-1.8)   07/25/18  05:43    


 


Lymph #  0.7 K/mm3 (1.2-5.4)  L  07/25/18  05:43    


 


Mono #  0.4 K/mm3 (0.0-0.8)   07/25/18  05:43    


 


Eos #  0.0 K/mm3 (0.0-0.4)   07/25/18  05:43    


 


Baso #  0.1 K/mm3 (0.0-0.1)   07/25/18  05:43    


 


Seg Neutrophils %  85.5 % (40.0-70.0)  H  07/25/18  05:43    


 


Seg Neutrophils #  7.2 K/mm3 (1.8-7.7)   07/25/18  05:43    


 


PT  20.7 Sec. (12.2-14.9)  H  07/24/18  11:09    


 


INR  1.67  (0.87-1.13)  H  07/24/18  11:09    


 


APTT  29.5 Sec. (24.2-36.6)   07/24/18  11:09    


 


Sodium  137 mmol/L (137-145)   07/26/18  05:28    


 


Potassium  4.0 mmol/L (3.6-5.0)   07/26/18  05:28    


 


Chloride  96.6 mmol/L ()  L  07/26/18  05:28    


 


Carbon Dioxide  26 mmol/L (22-30)   07/26/18  05:28    


 


Anion Gap  18 mmol/L  07/26/18  05:28    


 


BUN  13 mg/dL (7-17)   07/26/18  05:28    


 


Creatinine  3.7 mg/dL (0.7-1.2)  H  07/26/18  05:28    


 


Estimated GFR  15 ml/min  07/26/18  05:28    


 


BUN/Creatinine Ratio  4 %  07/26/18  05:28    


 


Glucose  106 mg/dL ()  H  07/26/18  05:28    


 


Calcium  8.2 mg/dL (8.4-10.2)  L  07/26/18  05:28    


 


Total Bilirubin  0.30 mg/dL (0.1-1.2)   07/24/18  11:09    


 


AST  18 units/L (5-40)   07/24/18  11:09    


 


ALT  8 units/L (7-56)   07/24/18  11:09    


 


Alkaline Phosphatase  121 units/L ()   07/24/18  11:09    


 


Total Protein  5.1 g/dL (6.3-8.2)  L  07/24/18  11:09    


 


Albumin  2.4 g/dL (3.9-5)  L  07/24/18  11:09    


 


Albumin/Globulin Ratio  0.9 %  07/24/18  11:09    


 


Lipase  14 units/L (13-60)   07/24/18  11:09    


 


Blood Type  B POSITIVE   07/24/18  11:09    


 


Antibody Screen  Negative   07/24/18  11:09    


 


Crossmatch  See Detail   07/24/18  11:09

## 2018-07-27 VITALS — DIASTOLIC BLOOD PRESSURE: 86 MMHG | SYSTOLIC BLOOD PRESSURE: 143 MMHG

## 2018-07-27 LAB
BUN SERPL-MCNC: 18 MG/DL (ref 7–17)
BUN/CREAT SERPL: 4 %
CALCIUM SERPL-MCNC: 8.4 MG/DL (ref 8.4–10.2)
HCT VFR BLD CALC: 27.3 % (ref 30.3–42.9)
HEMOLYSIS INDEX: 2
HGB BLD-MCNC: 8.9 GM/DL (ref 10.1–14.3)
MCH RBC QN AUTO: 29 PG (ref 28–32)
MCHC RBC AUTO-ENTMCNC: 33 % (ref 30–34)
MCV RBC AUTO: 89 FL (ref 79–97)
PLATELET # BLD: 142 K/MM3 (ref 140–440)
RBC # BLD AUTO: 3.07 M/MM3 (ref 3.65–5.03)

## 2018-07-27 PROCEDURE — 5A1D70Z PERFORMANCE OF URINARY FILTRATION, INTERMITTENT, LESS THAN 6 HOURS PER DAY: ICD-10-PCS | Performed by: INTERNAL MEDICINE

## 2018-07-27 RX ADMIN — HYDROMORPHONE HYDROCHLORIDE PRN MG: 1 INJECTION, SOLUTION INTRAMUSCULAR; INTRAVENOUS; SUBCUTANEOUS at 14:06

## 2018-07-27 RX ADMIN — Medication SCH ML: at 14:09

## 2018-07-27 RX ADMIN — FLUTICASONE PROPIONATE SCH: 50 SPRAY, METERED NASAL at 17:10

## 2018-07-27 RX ADMIN — MINOXIDIL SCH MG: 10 TABLET ORAL at 14:07

## 2018-07-27 RX ADMIN — IPRATROPIUM BROMIDE AND ALBUTEROL SULFATE SCH AMPUL: .5; 3 SOLUTION RESPIRATORY (INHALATION) at 02:18

## 2018-07-27 RX ADMIN — PRAZOSIN HYDROCHLORIDE SCH MG: 5 CAPSULE ORAL at 14:08

## 2018-07-27 RX ADMIN — CLONIDINE HYDROCHLORIDE SCH MG: 0.1 TABLET ORAL at 14:08

## 2018-07-27 RX ADMIN — HYDROMORPHONE HYDROCHLORIDE PRN MG: 1 INJECTION, SOLUTION INTRAMUSCULAR; INTRAVENOUS; SUBCUTANEOUS at 06:21

## 2018-07-27 RX ADMIN — NEPHROCAP SCH CAP: 1 CAP ORAL at 14:08

## 2018-07-27 RX ADMIN — HYDROMORPHONE HYDROCHLORIDE PRN MG: 1 INJECTION, SOLUTION INTRAMUSCULAR; INTRAVENOUS; SUBCUTANEOUS at 08:51

## 2018-07-27 RX ADMIN — IPRATROPIUM BROMIDE AND ALBUTEROL SULFATE SCH: .5; 3 SOLUTION RESPIRATORY (INHALATION) at 02:19

## 2018-07-27 RX ADMIN — IPRATROPIUM BROMIDE AND ALBUTEROL SULFATE SCH AMPUL: .5; 3 SOLUTION RESPIRATORY (INHALATION) at 02:19

## 2018-07-27 RX ADMIN — IPRATROPIUM BROMIDE AND ALBUTEROL SULFATE SCH: .5; 3 SOLUTION RESPIRATORY (INHALATION) at 02:18

## 2018-07-27 RX ADMIN — IPRATROPIUM BROMIDE AND ALBUTEROL SULFATE SCH AMPUL: .5; 3 SOLUTION RESPIRATORY (INHALATION) at 09:00

## 2018-07-27 RX ADMIN — IPRATROPIUM BROMIDE AND ALBUTEROL SULFATE SCH AMPUL: .5; 3 SOLUTION RESPIRATORY (INHALATION) at 14:30

## 2018-07-27 RX ADMIN — CLONIDINE HYDROCHLORIDE SCH MG: 0.1 TABLET ORAL at 09:10

## 2018-07-27 NOTE — DISCHARGE SUMMARY
Providers





- Providers


Date of Admission: 


07/24/18 12:48





Attending physician: 


DUSTIN GRAHAM MD





 





07/24/18 12:44


Consult to Physician [CONS] Urgent 


   Comment: DR LAURA PORTILLO NOTIFIED


   Consulting Provider: JENNIFER PORTILLO


   Physician Instructions: 


   Reason For Exam: rectal bleeding





07/24/18 12:50


Consult to Physician [CONS] Routine 


   Comment: DR AYOUB AWARE OF PT 1400


   Consulting Provider: NÉSTOR MORENO


   Physician Instructions: 


   Reason For Exam: ESRD











Primary care physician: 


PRIMARY CARE MD








Hospitalization


Reason for admission: GI bleed


Condition: Stable


Hospital course: 


66 YO Female with HTN, Diastolic CHF, GERD, ESRD on HD (M,W,F), HIV Disease, 

Diverticulosis presents to  ED for evaluation. Pt states that she has 

experienced rectal bleeding over the past 2 days, with an episode of large 

volume rectal bleeding today.  The patient states blood was just "pouring" out 

of her like someone had "turned on a faucet."  The patient complains of lower 

abdominal pain for the past 2 days as well as some rectal burning.  The patient 

states she began feeling weak and dizzy and this prompted her to come to the 

ED.  Patient states she feels more lightheaded when sitting up.  Patient denies 

fever, chills, CP, Palpitations, NVD, Trauma, unintentional weight loss, night 

sweats, or recent ill contacts.  Patient states she was last dialyzed 

yesterday. Nephrology consulted in ED. GI consulted in ED. 


Patient was recently in the hospital and underwent colonoscopy with snare 

polypectomy, there was also noted multiple diverticulosis coli.  Patient 

assisting with packed red blood cell of her family could not complete her 

colonoscopy prep but her bleeding was noted to have stopped.  Her hemoglobin 

remained relatively stable.  GI recommended an outpatient evaluation.  She 

continued to receive dialysis while in house.  We did discuss about avoiding 

antiplatelet at this time.








Discharge diagnosis


GI bleed-Rectal. 


ESRD


Severe Symptomatic Anemia secondary to GI bleed


HIV


HTN


Diverticulosis Coli


Secondary coagulopathy-?etiology


CHF-STABLE








Disposition: DC/TX-06 HOME UNDER HOME HLTH


Time spent for discharge: 35 mins





Core Measure Documentation





- Palliative Care


Palliative Care/ Comfort Measures: Not Applicable





- Core Measures


Any of the following diagnoses?: none





- VTE Discharge Requirements


Deep Vein Thrombosis/Pulmonary Embolism Present on Admission: No





Exam





- Physical Exam


Narrative exam: 


 VITAL SIGNS:  Reviewed.    


GENERAL:  The patient appeared chronically ill. Vital signs as documented.


HEAD:  No signs of head trauma.  Temporal wasting


EYES:  Pupils are equal.  Extraocular motions intact.  


EARS:  Hearing grossly intact.


MOUTH:  Oropharynx is normal. 


NECK:  No adenopathy, no JVD.   


CHEST:  Chest with clear breath sounds bilaterally.  No wheezes, rales, or 

rhonchi.  


CARDIAC:  Regular rate and rhythm.  S1 and S2, without murmurs, gallops, or 

rubs.


VASCULAR:  Fistula on the left arm with palpable chills No Edema.  Peripheral 

pulses normal and equal in all extremities.


ABDOMEN:  Soft, without detectable tenderness.  No sign of distention.  No   

rebound or guarding, and no masses palpated.   Bowel Sounds normal.


MUSCULOSKELETAL:  Good range of motion of all major joints. Extremities without 

clubbing, cyanosis or edema.  


NEUROLOGIC EXAM:  Alert and oriented x 3.  No focal sensory or strength 

deficits.   Speech normal.  Follows commands.


PSYCHIATRIC:  Mood normal.


SKIN:  No rash or lesions.














- Constitutional


Vitals: 


 











Temp Pulse Resp BP Pulse Ox


 


 98.6 F   101 H  20   164/80   98 


 


 07/26/18 20:48  07/27/18 02:35  07/27/18 06:51  07/26/18 21:39  07/26/18 22:00














Plan


Activity: advance as tolerated, fall precautions


Diet: renal


Special Instructions: record daily weights, record daily BP diary, home health 

RN


Follow up with: 


PRIMARY CARE,MD [Primary Care Provider] - 3-5 Days


JENNIFER PORTILLO MD [Staff Physician] - 7 Days


SHIRIN AYOUB MD [Staff Physician] - 7 Days


Forms:  Accompanied Note


Prescriptions: 


Pantoprazole [Protonix TAB] 40 mg PO DAILY #30 tablet

## 2018-07-27 NOTE — PROGRESS NOTE
Assessment and Plan





Impression


* End-stage renal disease


* Symptomatic anemia of abl poa


* gi bleeding


* Hypertension


* HIV disease





Recommendations


* prn packed RBC transfusion--s/p 2 units prbcs


* Continue dialysis on MWF schedule


* No IV, BP or of any puncture in access arm


* no heparin with hd, epogen with hd


* Further plans per  GI services 


* Hold PO4 binders for now 


* adjust diet and meds for  ESRD state








Subjective


Date of service: 07/27/18


Principal diagnosis: esrd


Interval history: 





resting in bed today





Objective





- Exam


Narrative Exam: 





General appearance: well-developed, well-nourished, appears stated age


EENT: PERRL, mucous membranes moist


Neck: Present: neck supple, trachea midline.  Absent: JVD/HJR, Masses


Respiratory: Clear to Ascultation


Heart: regular, normal heart rate


Gastrointestinal: Present: normal, normoactive bowel sounds


Integumentary: no rash, other (1+ edema.  AV fistula in her left upper arm.  

Good bruit and thrill)








- Vital Signs


Vital signs: 


 Vital Signs - 12hr











  07/27/18 07/27/18 07/27/18





  02:19 02:35 06:21


 


Temperature   


 


Pulse Rate   


 


Pulse Rate [ 98 H 101 H 





Bilateral]   


 


Respiratory   20





Rate   


 


Respiratory 18 20 





Rate [Bilateral   





]   


 


Blood Pressure   


 


O2 Sat by Pulse   





Oximetry   














  07/27/18 07/27/18 07/27/18





  06:51 07:30 09:10


 


Temperature  98.9 F 


 


Pulse Rate  100 H 100 H


 


Pulse Rate [   





Bilateral]   


 


Respiratory 20 20 





Rate   


 


Respiratory   





Rate [Bilateral   





]   


 


Blood Pressure  149/75 149/75


 


O2 Sat by Pulse  97 





Oximetry   














  07/27/18 07/27/18 07/27/18





  09:55 10:00 10:15


 


Temperature 98.9 F  


 


Pulse Rate 75 80 87


 


Pulse Rate [   





Bilateral]   


 


Respiratory 20  





Rate   


 


Respiratory   





Rate [Bilateral   





]   


 


Blood Pressure 170/79 167/75 154/79


 


O2 Sat by Pulse   





Oximetry   














  07/27/18 07/27/18 07/27/18





  10:30 10:45 11:00


 


Temperature   


 


Pulse Rate 83 83 84


 


Pulse Rate [   





Bilateral]   


 


Respiratory   





Rate   


 


Respiratory   





Rate [Bilateral   





]   


 


Blood Pressure 161/74 163/76 156/71


 


O2 Sat by Pulse   





Oximetry   














  07/27/18 07/27/18 07/27/18





  11:15 11:30 11:45


 


Temperature   


 


Pulse Rate 84 87 88


 


Pulse Rate [   





Bilateral]   


 


Respiratory   





Rate   


 


Respiratory   





Rate [Bilateral   





]   


 


Blood Pressure 150/72 157/74 164/75


 


O2 Sat by Pulse   





Oximetry   














  07/27/18 07/27/18 07/27/18





  12:00 12:15 12:30


 


Temperature   


 


Pulse Rate 90 85 86


 


Pulse Rate [   





Bilateral]   


 


Respiratory   





Rate   


 


Respiratory   





Rate [Bilateral   





]   


 


Blood Pressure 168/74 169/68 182/76


 


O2 Sat by Pulse   





Oximetry   














  07/27/18 07/27/18





  12:45 13:00


 


Temperature  


 


Pulse Rate 89 84


 


Pulse Rate [  





Bilateral]  


 


Respiratory  





Rate  


 


Respiratory  





Rate [Bilateral  





]  


 


Blood Pressure 160/80 168/78


 


O2 Sat by Pulse  





Oximetry  














- Lab





 07/27/18 05:22





 07/27/18 05:22


 Most recent lab results











Calcium  8.4 mg/dL (8.4-10.2)   07/27/18  05:22

## 2018-10-03 ENCOUNTER — HOSPITAL ENCOUNTER (INPATIENT)
Dept: HOSPITAL 5 - ED | Age: 68
LOS: 2 days | Discharge: HOME | DRG: 377 | End: 2018-10-05
Attending: INTERNAL MEDICINE | Admitting: INTERNAL MEDICINE
Payer: MEDICARE

## 2018-10-03 DIAGNOSIS — D62: ICD-10-CM

## 2018-10-03 DIAGNOSIS — Z82.49: ICD-10-CM

## 2018-10-03 DIAGNOSIS — I50.33: ICD-10-CM

## 2018-10-03 DIAGNOSIS — D68.59: ICD-10-CM

## 2018-10-03 DIAGNOSIS — Z88.6: ICD-10-CM

## 2018-10-03 DIAGNOSIS — K62.5: ICD-10-CM

## 2018-10-03 DIAGNOSIS — Z99.2: ICD-10-CM

## 2018-10-03 DIAGNOSIS — D63.1: ICD-10-CM

## 2018-10-03 DIAGNOSIS — N18.6: ICD-10-CM

## 2018-10-03 DIAGNOSIS — E43: ICD-10-CM

## 2018-10-03 DIAGNOSIS — B20: ICD-10-CM

## 2018-10-03 DIAGNOSIS — Z90.49: ICD-10-CM

## 2018-10-03 DIAGNOSIS — Z87.891: ICD-10-CM

## 2018-10-03 DIAGNOSIS — K21.9: ICD-10-CM

## 2018-10-03 DIAGNOSIS — Z88.8: ICD-10-CM

## 2018-10-03 DIAGNOSIS — I13.2: ICD-10-CM

## 2018-10-03 DIAGNOSIS — Z88.5: ICD-10-CM

## 2018-10-03 DIAGNOSIS — I16.0: ICD-10-CM

## 2018-10-03 DIAGNOSIS — K57.93: Primary | ICD-10-CM

## 2018-10-03 LAB
ALBUMIN SERPL-MCNC: 2.5 G/DL (ref 3.9–5)
ALT SERPL-CCNC: 7 UNITS/L (ref 7–56)
APTT BLD: 34.6 SEC. (ref 24.2–36.6)
BASOPHILS # (AUTO): 0.1 K/MM3 (ref 0–0.1)
BASOPHILS NFR BLD AUTO: 2.2 % (ref 0–1.8)
BUN SERPL-MCNC: 25 MG/DL (ref 7–17)
BUN/CREAT SERPL: 4 %
CALCIUM SERPL-MCNC: 8 MG/DL (ref 8.4–10.2)
EOSINOPHIL # BLD AUTO: 0 K/MM3 (ref 0–0.4)
EOSINOPHIL NFR BLD AUTO: 0.7 % (ref 0–4.3)
HCT VFR BLD CALC: 14.3 % (ref 30.3–42.9)
HEMOLYSIS INDEX: 3
HGB BLD-MCNC: 4.4 GM/DL (ref 10.1–14.3)
INR PPP: 1.39 (ref 0.87–1.13)
LIPASE SERPL-CCNC: 22 UNITS/L (ref 13–60)
LYMPHOCYTES # BLD AUTO: 1 K/MM3 (ref 1.2–5.4)
LYMPHOCYTES NFR BLD AUTO: 17.8 % (ref 13.4–35)
MCH RBC QN AUTO: 30 PG (ref 28–32)
MCHC RBC AUTO-ENTMCNC: 31 % (ref 30–34)
MCV RBC AUTO: 96 FL (ref 79–97)
MONOCYTES # (AUTO): 0.3 K/MM3 (ref 0–0.8)
MONOCYTES % (AUTO): 5.4 % (ref 0–7.3)
PLATELET # BLD: 136 K/MM3 (ref 140–440)
RBC # BLD AUTO: 1.49 M/MM3 (ref 3.65–5.03)

## 2018-10-03 PROCEDURE — 86920 COMPATIBILITY TEST SPIN: CPT

## 2018-10-03 PROCEDURE — 96374 THER/PROPH/DIAG INJ IV PUSH: CPT

## 2018-10-03 PROCEDURE — 36415 COLL VENOUS BLD VENIPUNCTURE: CPT

## 2018-10-03 PROCEDURE — 71045 X-RAY EXAM CHEST 1 VIEW: CPT

## 2018-10-03 PROCEDURE — 86901 BLOOD TYPING SEROLOGIC RH(D): CPT

## 2018-10-03 PROCEDURE — P9016 RBC LEUKOCYTES REDUCED: HCPCS

## 2018-10-03 PROCEDURE — 36430 TRANSFUSION BLD/BLD COMPNT: CPT

## 2018-10-03 PROCEDURE — 85610 PROTHROMBIN TIME: CPT

## 2018-10-03 PROCEDURE — 83690 ASSAY OF LIPASE: CPT

## 2018-10-03 PROCEDURE — 85018 HEMOGLOBIN: CPT

## 2018-10-03 PROCEDURE — 93005 ELECTROCARDIOGRAM TRACING: CPT

## 2018-10-03 PROCEDURE — 86850 RBC ANTIBODY SCREEN: CPT

## 2018-10-03 PROCEDURE — 94760 N-INVAS EAR/PLS OXIMETRY 1: CPT

## 2018-10-03 PROCEDURE — C9113 INJ PANTOPRAZOLE SODIUM, VIA: HCPCS

## 2018-10-03 PROCEDURE — 85014 HEMATOCRIT: CPT

## 2018-10-03 PROCEDURE — 80048 BASIC METABOLIC PNL TOTAL CA: CPT

## 2018-10-03 PROCEDURE — 85730 THROMBOPLASTIN TIME PARTIAL: CPT

## 2018-10-03 PROCEDURE — 86900 BLOOD TYPING SEROLOGIC ABO: CPT

## 2018-10-03 PROCEDURE — 85025 COMPLETE CBC W/AUTO DIFF WBC: CPT

## 2018-10-03 PROCEDURE — 80053 COMPREHEN METABOLIC PANEL: CPT

## 2018-10-03 PROCEDURE — 30233N1 TRANSFUSION OF NONAUTOLOGOUS RED BLOOD CELLS INTO PERIPHERAL VEIN, PERCUTANEOUS APPROACH: ICD-10-PCS | Performed by: INTERNAL MEDICINE

## 2018-10-03 PROCEDURE — 84443 ASSAY THYROID STIM HORMONE: CPT

## 2018-10-03 PROCEDURE — 85027 COMPLETE CBC AUTOMATED: CPT

## 2018-10-03 PROCEDURE — 93010 ELECTROCARDIOGRAM REPORT: CPT

## 2018-10-03 RX ADMIN — Medication SCH ML: at 22:09

## 2018-10-03 RX ADMIN — PANTOPRAZOLE SODIUM SCH MG: 40 INJECTION, POWDER, FOR SOLUTION INTRAVENOUS at 22:08

## 2018-10-03 RX ADMIN — CLONIDINE HYDROCHLORIDE SCH MG: 0.2 TABLET ORAL at 18:29

## 2018-10-03 RX ADMIN — HYDROMORPHONE HYDROCHLORIDE PRN MG: 1 INJECTION, SOLUTION INTRAMUSCULAR; INTRAVENOUS; SUBCUTANEOUS at 22:05

## 2018-10-03 RX ADMIN — CLONIDINE HYDROCHLORIDE SCH MG: 0.2 TABLET ORAL at 22:06

## 2018-10-03 RX ADMIN — CALCIUM ACETATE SCH MG: 667 CAPSULE ORAL at 18:54

## 2018-10-03 NOTE — EMERGENCY DEPARTMENT REPORT
HPI





- General


Chief Complaint: Recheck/Abnormal Lab/Rx


Time Seen by Provider: 10/03/18 13:49





- HPI


HPI: 





68-year-old  female presents to the emergency department via 

EMS from dialysis and was sent here secondary to having low hemoglobin.  She is 

normally end-stage renal disease on hemodialysis on Monday/Wednesday/Friday and 

says that she did last have it on Monday.  Her nephrologist is Dr. Benitez.  She 

has a past medical history as well of CHF, GERD, HIV, hypertension.  She 

complains of some generalized weakness.  She also has the complaint of some 

mild shortness of breath.  She denies any fever, chest pain, nausea, vomiting.  

There is some mild lower external swelling.  No recent travel or sick contacts 

at home.





ED Past Medical Hx





- Past Medical History


Hx Hypertension: Yes


Hx Heart Attack/AMI: No


Hx Congestive Heart Failure: Yes


Hx Diabetes: No


Hx GERD: Yes


Hx Liver Disease: Yes (abnormal liver function test, coagulopathy, mass on liver

)


Hx Renal Disease: Yes (M, W, F)


Hx Arthritis: No


Hx Asthma: No


Hx COPD: No


Hx HIV: Yes





- Surgical History


Hx Coronary Stent: No


Hx Open Heart Surgery: No


Hx Pacemaker: No


Hx Internal Defibrillator: No


Hx Cholecystectomy: No


Hx Appendectomy: Yes


Hx Breast Surgery: No


Additional Surgical History: PD catheter -removed.  FISTULA LEFT ARM





- Social History


Smoking Status: Former Smoker





- Medications


Home Medications: 


 Home Medications











 Medication  Instructions  Recorded  Confirmed  Last Taken  Type


 


Efavirenz [Sustiva] 600 mg PO DAILY 02/02/17 10/03/18 10/02/18 History


 


Terazosin [Hytrin] 10 mg PO BID PRN 02/02/17 10/03/18 10/02/18 History


 


Tenofovir [Viread] 300 mg PO QWEEK 07/17/17 10/03/18 07/19/18 History


 


Fluticasone [Flonase] 1 spray NS QDAY #1 bottle 06/13/18 10/03/18 Unknown Rx


 


B Complex 11/Folic/C/Biot/Zinc 1 each PO QDAY 07/10/18 10/03/18 10/02/18 History





[Dialyvite with Zinc Tablet]     


 


lamiVUDine [Epivir] 150 mg PO QPM 07/10/18 10/03/18 10/02/18 History


 


Calcium Acetate [Phoslo] 1,334 mg PO TIDWM 07/24/18 10/03/18 10/02/18 History


 


cloNIDine [Catapres] 0.2 mg PO QID 07/24/18 10/03/18 10/03/18 History


 


Pantoprazole [Protonix TAB] 40 mg PO DAILY #30 tablet 07/27/18 10/03/18 10/02/

18 Rx


 


Minoxidil [Loniten] 10 mg PO BID PRN 10/03/18 10/03/18 10/02/18 History














ED Review of Systems


ROS: 


Stated complaint: WEAKNESS


Other details as noted in HPI





Comment: All other systems reviewed and negative


Constitutional: denies: chills, fever


Eyes: denies: eye pain, eye discharge, vision change


ENT: denies: ear pain, throat pain


Respiratory: shortness of breath.  denies: cough


Cardiovascular: edema.  denies: chest pain


Gastrointestinal: denies: nausea, vomiting


Genitourinary: denies: dysuria, discharge


Musculoskeletal: denies: back pain, arthralgia


Skin: denies: rash, lesions


Neurological: denies: headache, numbness





Physical Exam





- Physical Exam


Vital Signs: 


 Vital Signs











  10/03/18





  13:41


 


Temperature 98.7 F


 


Pulse Rate 85


 


Respiratory 20





Rate 


 


Blood Pressure 128/68


 


O2 Sat by Pulse 100





Oximetry 











Physical Exam: 





GENERAL: The patient is well-developed well-nourished.


HENT: Normocephalic.  Atraumatic.    Patient has moist mucous membranes.


EYES: Extraocular motions are intact.  Pupils equal reactive to light 

bilaterally.


NECK: Supple.  Trachea is midline.


CHEST/LUNGS: Mild coarse breath sounds.  No tachypnea or accessory muscle use.  

There is no respiratory distress noted.


HEART/CARDIOVASCULAR: Regular.  There is no tachycardia.  There is no murmur.


ABDOMEN: Abdomen is soft. Patient has normal bowel sounds.  There is no 

abdominal distention.


SKIN: Skin is warm and dry.  Mild lower extremity swelling.


NEURO: The patient is awake, alert, and oriented.  The patient is cooperative.  

The patient has no focal neurologic deficits.  The patient has normal speech.


MUSCULOSKELETAL: There is no tenderness or deformity.  There is no evidence of 

acute injury.





ED Course


 Vital Signs











  10/03/18





  13:41


 


Temperature 98.7 F


 


Pulse Rate 85


 


Respiratory 20





Rate 


 


Blood Pressure 128/68


 


O2 Sat by Pulse 100





Oximetry 














ED Medical Decision Making





- Lab Data


Result diagrams: 


 10/03/18 13:51





 10/03/18 13:51





- EKG Data


-: EKG Interpreted by Me


EKG shows normal: sinus rhythm, axis, intervals, QRS complexes (LVH), ST-T waves


Rate: normal





- EKG Data


When compared to previous EKG there are: no significant change


Interpretation: unchanged when compared t (7/28/18)





- Radiology Data


Radiology results: image reviewed


interpreted by me: 





Chest x-ray shows mild pulmonary vascular congestion.  No pneumonia, focal 

consolidation or obvious pleural effusions.





- Medical Decision Making


Patient presents from dialysis without having dialysis secondary to low 

hemoglobin that was found.  She has some shortness of breath but does not 

appear to be in any type of respiratory distress.  Chest x-ray does not show 

any significant volume overload but there is some mild vascular congestion.  

Patient's labs show a hemoglobin of 4.4.  Mostly related to her anemia of 

chronic kidney disease.  I have initiated transfusion with packed red blood 

cells.  The nephrology service has been contacted regarding a consult for her 

end-stage renal disease and needing dialysis.  Vital signs stable throughout 

her ED course thus far.  The patient will be admitted to the hospital for 

further evaluation and treatment was accepted for admission by the hospitalist, 

Dr. Jeffries.








- Differential Diagnosis


CHF, iron deficiency, anemia of CKD, dysrythmia


Critical Care Time: Yes


Critical care time in (mins) excluding proc time.: 31


Critical care attestation.: 


If time is entered above; I have spent that time in minutes in the direct care 

of this critically ill patient, excluding procedure time.


Critical care time was spent on this patient and doing her initial evaluation, 

multiple re-evaluations, discussions with the nephrology service and admitting 

hospitalist, ordering and interpretation of labs and imaging, ordering and 

monitoring for blood transfusion.


Critical Care Time: 





31 minutes





ED Disposition


Clinical Impression: 


 Anemia requiring transfusions, Dyspnea, ESRD on hemodialysis





Disposition: DC-09 OP ADMIT IP TO THIS HOSP


Is pt being admited?: Yes


Condition: Fair


Time of Disposition: 16:26

## 2018-10-03 NOTE — XRAY REPORT
FINAL REPORT



EXAM:  XR CHEST 1V AP



HISTORY:  SOB 



TECHNIQUE:  Frontal portable examination of the chest



PRIORS:  Abdomen radiographs and AP CT 7/5/2018



FINDINGS:  

Stable atelectasis or scar left lung base. New linear atelectasis

right lung base. New slight right CP angle pleural thickening

compatible with small pleural effusion. 



Cardiac silhouette size markedly enlarged without definite

evidence of vascular congestion. Nonspecific new slight patchy

opacity right lower lung may be edema or pneumonitis. 



No pneumothorax. No acute displaced fracture. 



IMPRESSION:  

Marked cardiomegaly without definite evidence of vascular

congestion



New small focus of patchy opacity right lower lung may be edema

or pneumonitis



New linear atelectasis just above right CP angle 



Suggestion of new small right pleural effusion 



Stable atelectasis or scar in left lung base

## 2018-10-03 NOTE — HISTORY AND PHYSICAL REPORT
History of Present Illness


Chief complaint: 





I feel weak


History of present illness: 


69 YO Female with HTN, Diastolic CHF, GERD, ESRD on HD (M,W,F), HIV Disease, 

Diverticulosis presents to  ED for evaluation. Pt states that she has 

experienced generalized weakness, shortness of breath, and decreased exercise 

tolerance over the past 2 weeks. Pt presented to her dialysis unit today for 

routine dialysis and was found to have hgb of around 4. EMS was notified, and 

patient transported to [facility] for further care and evaluation. Pt seen and 

evaluated in ED and found to have ESRD complicated by Symptomatic anemia. 

Nephrology consulted in ED. GI consulted in ED. Patient denies fever, chills, CP

, Palpitations, NVD, Trauma, unintentional weight loss, night sweats, BRBPR, 

productive cough, or recent ill contacts.  











Past History


Past Medical History: ESRD, GERD, heart failure, HIV/AIDS, hypertension


Past Surgical History: Other (colonoscopy, LUE AVF)


Social history: , lives with family.  denies: smoking, alcohol abuse, 

prescription drug abuse


Family history: hypertension





Medications and Allergies


 Allergies











Allergy/AdvReac Type Severity Reaction Status Date / Time


 


codeine Allergy  HALLUCINATE Verified 07/05/18 17:25


 


morphine Allergy  hallucinati Verified 07/11/18 00:04





   on  


 


hydralazine AdvReac  Vomiting Verified 07/05/18 17:25











 Home Medications











 Medication  Instructions  Recorded  Confirmed  Last Taken  Type


 


Efavirenz [Sustiva] 600 mg PO DAILY 02/02/17 10/03/18 10/02/18 History


 


Terazosin [Hytrin] 10 mg PO BID PRN 02/02/17 10/03/18 10/02/18 History


 


Tenofovir [Viread] 300 mg PO QWEEK 07/17/17 10/03/18 07/19/18 History


 


Fluticasone [Flonase] 1 spray NS QDAY #1 bottle 06/13/18 10/03/18 Unknown Rx


 


B Complex 11/Folic/C/Biot/Zinc 1 each PO QDAY 07/10/18 10/03/18 10/02/18 History





[Dialyvite with Zinc Tablet]     


 


lamiVUDine [Epivir] 150 mg PO QPM 07/10/18 10/03/18 10/02/18 History


 


Calcium Acetate [Phoslo] 1,334 mg PO TIDWM 07/24/18 10/03/18 10/02/18 History


 


cloNIDine [Catapres] 0.2 mg PO QID 07/24/18 10/03/18 10/03/18 History


 


Pantoprazole [Protonix TAB] 40 mg PO DAILY #30 tablet 07/27/18 10/03/18 10/02/

18 Rx


 


Minoxidil [Loniten] 10 mg PO BID PRN 10/03/18 10/03/18 10/02/18 History














Review of Systems


Constitutional: weakness, no weight loss, no weight gain, no fever


Ears, nose, mouth and throat: no ear pain, no ear discharge, no tinnitis, no 

decreased hearing, no nose pain, no nasal congestion


Breasts: no change in shape, no swelling, no mass


Cardiovascular: no chest pain, no orthopnea, no palpitations, no rapid/

irregular heart beat, no edema, no syncope


Respiratory: no cough, no cough with sputum, no excessive sputum, no hemoptysis

, no shortness of breath, no dyspnea on exertion


Gastrointestinal: no abdominal pain, no nausea, no constipation, no change in 

bowel habits, no hematemesis


Genitourinary Female: no pelvic pain, no flank pain, no menorrhagia, no dysuria

, no urinary frequency, no urgency


Rectal: no pain, no incontinence, no bleeding


Musculoskeletal: no neck stiffness, no neck pain, no shooting arm pain, no arm 

numbness/tingling, no low back pain, no shooting leg pain


Integumentary: no rash, no pruritis, no redness, no sores, no wounds


Neurological: no head injury, no transient paralysis, no paralysis, no weakness

, no parathesias, no numbness


Psychiatric: no anxiety, no memory loss, no change in sleep habits, no sleep 

disturbances, no insomnia, no hypersomnia, no change in appetite


Endocrine: no cold intolerance, no heat intolerance, no polyphagia, no 

excessive thirst, no polydipsia, no polyuria


Hematologic/Lymphatic: no easy bruising, no easy bleeding, no lymphadenopathy, 

no lymphedema


Allergic/Immunologic: no urticaria, no allergic rhinitis, no wheezing, no 

persistent infections, no anaphylaxis, no angioedema





Exam





- Constitutional


Vitals: 


 











Temp Pulse Resp BP Pulse Ox


 


 98.7 F   85   18   128/68   97 


 


 10/03/18 13:41  10/03/18 13:41  10/03/18 14:21  10/03/18 13:41  10/03/18 14:21











General appearance: Present: mild distress





- EENT


Eyes: Present: PERRL


ENT: hearing intact, clear oral mucosa





- Neck


Neck: Present: supple, normal ROM





- Respiratory


Respiratory effort: normal


Respiratory: bilateral: CTA





- Cardiovascular


Heart Sounds: Present: S1 & S2.  Absent: rub, click





- Extremities


Extremities: pulses symmetrical, No edema


Peripheral Pulses: within normal limits





- Abdominal


General gastrointestinal: Present: soft, non-tender, non-distended, normal 

bowel sounds


Female genitourinary: Present: normal





- Integumentary


Integumentary: Present: clear, warm, dry





- Musculoskeletal


Musculoskeletal: gait normal, strength equal bilaterally





- Psychiatric


Psychiatric: appropriate mood/affect, intact judgment & insight





- Neurologic


Neurologic: CNII-XII intact, moves all extremities





Results





- Labs


CBC & Chem 7: 


 10/03/18 13:51





 10/03/18 13:51


Labs: 


 Abnormal lab results











  10/03/18 10/03/18 10/03/18 Range/Units





  13:51 13:51 13:51 


 


RBC  1.49 L    (3.65-5.03)  M/mm3


 


Hgb  4.4 L*    (10.1-14.3)  gm/dl


 


Hct  14.3 L*    (30.3-42.9)  %


 


RDW  21.1 H    (13.2-15.2)  %


 


Plt Count  136 L    (140-440)  K/mm3


 


Baso % (Auto)  2.2 H    (0.0-1.8)  %


 


Lymph #  1.0 L    (1.2-5.4)  K/mm3


 


Seg Neutrophils %  73.9 H    (40.0-70.0)  %


 


PT   17.9 H   (12.2-14.9)  Sec.


 


INR   1.39 H   (0.87-1.13)  


 


Potassium    3.5 L  (3.6-5.0)  mmol/L


 


Chloride    96.7 L  ()  mmol/L


 


BUN    25 H  (7-17)  mg/dL


 


Creatinine    5.6 H  (0.7-1.2)  mg/dL


 


Calcium    8.0 L  (8.4-10.2)  mg/dL


 


Total Protein    5.3 L  (6.3-8.2)  g/dL


 


Albumin    2.5 L  (3.9-5)  g/dL














Assessment and Plan





- Patient Problems


(1) CHF (congestive heart failure)


Current Visit: Yes   Status: Acute   


Qualifiers: 


   Heart failure type: diastolic 


Plan to address problem: 


Strict I/O, monitor uop q shift, afterload reduction, dialysis as per renal team

, chest x ray, 








(2) ESRD on hemodialysis


Current Visit: Yes   Status: Chronic   


Plan to address problem: 


Nephrology consulted in ED, avoid nephrotoxic agents, dialysis as per renal 

team. 








(3) HIV disease


Current Visit: Yes   Status: Acute   


Plan to address problem: 


resume antiretroviral therapy, outpatient ID f/u care. 








(4) GI bleed


Current Visit: Yes   Status: Acute   


Qualifiers: 


   GI bleed type/associated pathology: diverticulosis   Qualified Code(s): 

K57.91 - Diverticulosis of intestine, part unspecified, without perforation or 

abscess with bleeding   


Plan to address problem: 


PRBC transfusion, IV ppi therapy, GI consulted in ED, stool for occult blood








(5) Anemia


Current Visit: Yes   Status: Acute   


Qualifiers: 


   Anemia type: due to chronic kidney disease   Chronic kidney disease stage: 

on chronic dialysis   Qualified Code(s): N18.6 - End stage renal disease; D63.1 

- Anemia in chronic kidney disease; Z99.2 - Dependence on renal dialysis   


Plan to address problem: 


PRBC transfusion with dialysis, repeat CBC, 








(6) DVT prophylaxis


Current Visit: Yes   Status: Acute   


Plan to address problem: 


SCD to BLE while in bed.

## 2018-10-04 LAB
HCT VFR BLD CALC: 22.7 % (ref 30.3–42.9)
HCT VFR BLD CALC: 23.6 % (ref 30.3–42.9)
HGB BLD-MCNC: 7.5 GM/DL (ref 10.1–14.3)
HGB BLD-MCNC: 7.6 GM/DL (ref 10.1–14.3)

## 2018-10-04 PROCEDURE — 5A1D70Z PERFORMANCE OF URINARY FILTRATION, INTERMITTENT, LESS THAN 6 HOURS PER DAY: ICD-10-PCS | Performed by: INTERNAL MEDICINE

## 2018-10-04 RX ADMIN — HYDROMORPHONE HYDROCHLORIDE PRN MG: 1 INJECTION, SOLUTION INTRAMUSCULAR; INTRAVENOUS; SUBCUTANEOUS at 03:13

## 2018-10-04 RX ADMIN — CLONIDINE HYDROCHLORIDE SCH MG: 0.2 TABLET ORAL at 09:37

## 2018-10-04 RX ADMIN — CLONIDINE HYDROCHLORIDE SCH MG: 0.2 TABLET ORAL at 18:17

## 2018-10-04 RX ADMIN — Medication SCH ML: at 21:20

## 2018-10-04 RX ADMIN — PANTOPRAZOLE SODIUM SCH MG: 40 INJECTION, POWDER, FOR SOLUTION INTRAVENOUS at 09:38

## 2018-10-04 RX ADMIN — PANTOPRAZOLE SODIUM SCH MG: 40 INJECTION, POWDER, FOR SOLUTION INTRAVENOUS at 21:20

## 2018-10-04 RX ADMIN — HYDROMORPHONE HYDROCHLORIDE PRN MG: 1 INJECTION, SOLUTION INTRAMUSCULAR; INTRAVENOUS; SUBCUTANEOUS at 13:49

## 2018-10-04 RX ADMIN — Medication SCH ML: at 10:00

## 2018-10-04 RX ADMIN — CLONIDINE HYDROCHLORIDE SCH MG: 0.2 TABLET ORAL at 21:19

## 2018-10-04 RX ADMIN — HYDROMORPHONE HYDROCHLORIDE PRN MG: 1 INJECTION, SOLUTION INTRAMUSCULAR; INTRAVENOUS; SUBCUTANEOUS at 09:49

## 2018-10-04 RX ADMIN — CLONIDINE HYDROCHLORIDE SCH: 0.2 TABLET ORAL at 14:00

## 2018-10-04 RX ADMIN — FLUTICASONE PROPIONATE SCH: 50 SPRAY, METERED NASAL at 09:50

## 2018-10-04 RX ADMIN — CALCIUM ACETATE SCH MG: 667 CAPSULE ORAL at 18:17

## 2018-10-04 RX ADMIN — HYDROMORPHONE HYDROCHLORIDE PRN MG: 1 INJECTION, SOLUTION INTRAMUSCULAR; INTRAVENOUS; SUBCUTANEOUS at 18:25

## 2018-10-04 RX ADMIN — NEPHROCAP SCH CAP: 1 CAP ORAL at 09:37

## 2018-10-04 RX ADMIN — CALCIUM ACETATE SCH: 667 CAPSULE ORAL at 12:40

## 2018-10-04 RX ADMIN — CALCIUM ACETATE SCH MG: 667 CAPSULE ORAL at 08:30

## 2018-10-04 NOTE — GASTROENTEROLOGY CONSULTATION
History of Present Illness





- Reason for Consult


Consult date: 10/04/18


GI bleed/anemia


Requesting physician: TRICIA TRACEY





- History of Present Illness


Patient is a 66 y/o female with PMH of ESRD on HD, CHF, HIV, and HTN who was 

sent to ED for an evaluation after she was found to have a HGB of 4 during 

routine dialysis with associated generalized weakness, SOB, and decreased 

exercise tolerance and was admitted with symptomatic anemia to which GI has 

been consulted. This morning patient was resting in bed w/o acute distress. She 

states she is now feeling better s/p blood transfusion. She reports having 

multiple episodes of rectal bleeding with a large amount of bright red blood 

mixed with clots this past Saturday and Sunday but states that the bleeding has 

since resolved with no further signs of bleeding in the past 4 days. No 

hematemesis or melena. Denies CP, SOB, dizziness, wt loss, N/V, or diarrhea. 

Has chronic constipation which is improved with taking stool softeners at home. 

Tolerating diet. Patient is well known to our service with multiple 

hospitalizations this year with similar symptoms with etiology thought to be 

diverticular in nature. She underwent a colonoscopy on 7/12/18 that revealed 

multiple polyps (TA), extensive diverticulosis of the left colon, and internal 

hemorrhoids. Last EGD 9/2017 showed gastritis and esophagitis (bx (-) for h 

pylori/barretts). 








Past History


Past Medical History: ESRD, GERD, heart failure, HIV/AIDS, hypertension


Past Surgical History: appendectomy, Other (colonoscopy, LUE AVF, removal of PD 

catheter)


Social history: , lives with family.  denies: smoking, alcohol abuse, 

prescription drug abuse


Family history: hypertension





Medications and Allergies


 Allergies











Allergy/AdvReac Type Severity Reaction Status Date / Time


 


codeine Allergy  HALLUCINATE Verified 07/05/18 17:25


 


morphine Allergy  hallucinati Verified 07/11/18 00:04





   on  


 


hydralazine AdvReac  Vomiting Verified 07/05/18 17:25











 Home Medications











 Medication  Instructions  Recorded  Confirmed  Last Taken  Type


 


Efavirenz [Sustiva] 600 mg PO DAILY 02/02/17 10/03/18 10/02/18 History


 


Terazosin [Hytrin] 10 mg PO BID PRN 02/02/17 10/03/18 10/02/18 History


 


Tenofovir [Viread] 300 mg PO QWEEK 07/17/17 10/03/18 07/19/18 History


 


Fluticasone [Flonase] 1 spray NS QDAY #1 bottle 06/13/18 10/03/18 Unknown Rx


 


B Complex 11/Folic/C/Biot/Zinc 1 each PO QDAY 07/10/18 10/03/18 10/02/18 History





[Dialyvite with Zinc Tablet]     


 


lamiVUDine [Epivir] 150 mg PO QPM 07/10/18 10/03/18 10/02/18 History


 


Calcium Acetate [Phoslo] 1,334 mg PO TIDWM 07/24/18 10/03/18 10/02/18 History


 


cloNIDine [Catapres] 0.2 mg PO QID 07/24/18 10/03/18 10/03/18 History


 


Pantoprazole [Protonix TAB] 40 mg PO DAILY #30 tablet 07/27/18 10/03/18 10/02/

18 Rx


 


Minoxidil [Loniten] 10 mg PO BID PRN 10/03/18 10/03/18 10/02/18 History











Active Meds: 


Active Medications





Acetaminophen (Tylenol)  650 mg PO Q4H PRN


   PRN Reason: Pain MILD(1-3)/Fever >100.5/HA


Albuterol (Proventil)  2.5 mg IH Q4HRT PRN


   PRN Reason: Shortness Of Breath


Calcium Acetate (Phoslo)  1,334 mg PO TIDWM [facility]


   Last Admin: 10/03/18 18:54 Dose:  1,334 mg


Clonidine HCl (Catapres)  0.2 mg PO QID [facility]


   Last Admin: 10/04/18 09:37 Dose:  0.2 mg


Efavirenz (Sustiva)  600 mg PO QHS [facility]


Fluticasone Propionate (Flonase)  50 mcg NS QDAY [facility]


Hydromorphone HCl (Dilaudid)  0.25 mg IV Q3H PRN


   PRN Reason: Pain , Severe (7-10)


   Last Admin: 10/04/18 09:49 Dose:  0.25 mg


Lamivudine (Epivir)  50 mg PO QPM [facility]


Minoxidil (Loniten)  10 mg PO BID PRN


   PRN Reason: Blood Pressure


Multivit/Ca Carb/B Cmplx/FA/Prenat (Renal Caps)  1 cap PO QDAY [facility]


   Last Admin: 10/04/18 09:37 Dose:  1 cap


Ondansetron HCl (Zofran)  4 mg IV Q8H PRN


   PRN Reason: Nausea And Vomiting


Pantoprazole Sodium (Protonix)  40 mg IV BID [facility]


   Last Admin: 10/04/18 09:38 Dose:  40 mg


Prazosin HCl (Minipress)  5 mg PO Q12HR PRN


   PRN Reason: BLOODPRESSURE


Sodium Chloride (Sodium Chloride Flush Syringe 10 Ml)  10 ml IV BID [facility]


   Last Admin: 10/03/18 22:09 Dose:  10 ml


Sodium Chloride (Sodium Chloride Flush Syringe 10 Ml)  10 ml IV PRN PRN


   PRN Reason: LINE FLUSH


Tenofovir Disoproxil Fumarate (Viread)  300 mg PO Th [facility]


   Last Admin: 10/04/18 09:43 Dose:  300 mg











Exam





- Constitutional


Vital Signs: 


 











Temp Pulse Resp BP Pulse Ox


 


 98.2 F   80   16   142/68   100 


 


 10/04/18 07:25  10/04/18 09:37  10/04/18 09:49  10/04/18 09:37  10/04/18 07:25














- Labs


CBC & Chem 7: 


 10/04/18 06:41





 10/03/18 13:51


Lab Results: 


 Laboratory Results - last 24 hr











  10/03/18 10/03/18 10/03/18





  13:51 13:51 13:51


 


WBC  5.6  


 


RBC  1.49 L  


 


Hgb  4.4 L*  


 


Hct  14.3 L*  


 


MCV  96  


 


MCH  30  


 


MCHC  31  


 


RDW  21.1 H  


 


Plt Count  136 L  


 


Lymph % (Auto)  17.8  


 


Mono % (Auto)  5.4  


 


Eos % (Auto)  0.7  


 


Baso % (Auto)  2.2 H  


 


Lymph #  1.0 L  


 


Mono #  0.3  


 


Eos #  0.0  


 


Baso #  0.1  


 


Seg Neutrophils %  73.9 H  


 


Seg Neutrophils #  4.1  


 


PT   17.9 H 


 


INR   1.39 H 


 


APTT   34.6 


 


Sodium    139


 


Potassium    3.5 L


 


Chloride    96.7 L


 


Carbon Dioxide    30


 


Anion Gap    16


 


BUN    25 H


 


Creatinine    5.6 H


 


Estimated GFR    9


 


BUN/Creatinine Ratio    4


 


Glucose    92


 


Calcium    8.0 L


 


Total Bilirubin    0.20


 


AST    23


 


ALT    7


 


Alkaline Phosphatase    92


 


Total Protein    5.3 L


 


Albumin    2.5 L


 


Albumin/Globulin Ratio    0.9


 


Lipase    22


 


TSH   


 


Blood Type   


 


Antibody Screen   


 


Crossmatch   














  10/03/18 10/03/18 10/04/18





  13:51 14:04 04:55


 


WBC   


 


RBC   


 


Hgb    7.5 L D


 


Hct    22.7 L D


 


MCV   


 


MCH   


 


MCHC   


 


RDW   


 


Plt Count   


 


Lymph % (Auto)   


 


Mono % (Auto)   


 


Eos % (Auto)   


 


Baso % (Auto)   


 


Lymph #   


 


Mono #   


 


Eos #   


 


Baso #   


 


Seg Neutrophils %   


 


Seg Neutrophils #   


 


PT   


 


INR   


 


APTT   


 


Sodium   


 


Potassium   


 


Chloride   


 


Carbon Dioxide   


 


Anion Gap   


 


BUN   


 


Creatinine   


 


Estimated GFR   


 


BUN/Creatinine Ratio   


 


Glucose   


 


Calcium   


 


Total Bilirubin   


 


AST   


 


ALT   


 


Alkaline Phosphatase   


 


Total Protein   


 


Albumin   


 


Albumin/Globulin Ratio   


 


Lipase   


 


TSH   3.890 


 


Blood Type  B POSITIVE  


 


Antibody Screen  Negative  


 


Crossmatch  See Detail  














  10/04/18





  06:41


 


WBC 


 


RBC 


 


Hgb  7.6 L


 


Hct  23.6 L


 


MCV 


 


MCH 


 


MCHC 


 


RDW 


 


Plt Count 


 


Lymph % (Auto) 


 


Mono % (Auto) 


 


Eos % (Auto) 


 


Baso % (Auto) 


 


Lymph # 


 


Mono # 


 


Eos # 


 


Baso # 


 


Seg Neutrophils % 


 


Seg Neutrophils # 


 


PT 


 


INR 


 


APTT 


 


Sodium 


 


Potassium 


 


Chloride 


 


Carbon Dioxide 


 


Anion Gap 


 


BUN 


 


Creatinine 


 


Estimated GFR 


 


BUN/Creatinine Ratio 


 


Glucose 


 


Calcium 


 


Total Bilirubin 


 


AST 


 


ALT 


 


Alkaline Phosphatase 


 


Total Protein 


 


Albumin 


 


Albumin/Globulin Ratio 


 


Lipase 


 


TSH 


 


Blood Type 


 


Antibody Screen 


 


Crossmatch 














Assessment and Plan


1.anemia


 2.rectal bleeding


 3.ESRD on HD 


 4.HTN


 5.CHF


 6.HIV





-H/H 4.4/14.3 on admission- now 7.6/23.6 s/p transfusion PRBCs


-continue to monitor H/H and transfuse as needed


-hold blood thinning medications


-HD stable


-patient with h/o multiple episodes of GI bleeding thought to be diverticular 

in nature who now presents with acute on chronic anemia with recurrent rectal 

bleeding. She reports multiple episodes of heavy rectal bleeding this past 

Saturday/Sunday mixed with clots which has now resolved with no further active 

signs of bleeding x 4 days


-EGD 9/17 esophagitis and gastritis (Bx negative H pylori/Barretts)


-colonoscopy 7/12/18- revealed multiple polyps (TA), extensive diverticulosis 

of the left colon, and internal hemorrhoids


-etiology- likely diverticular 


-clinically, patient is stable. Denies abd pain or N/V. Tolerating diet


-continue current medications and supportive care


-no recommendations for repeat endoscopic at this time


-if bleeding reoccurs recommend bleeding scan (may need IR intervention vs 

surgery consult)


-if no further bleeding and labs remain stable, patient is okay to be d/c per 

GI standpoint with follow up clinic appt

## 2018-10-04 NOTE — CONSULTATION
History of Present Illness





- Reason for Consult


Consult date: 10/04/18


end stage renal disease


Requesting physician: KEYLA ENCISO





- History of Present Illness





68-year-old  female presents to the emergency department via 

EMS from dialysis and was sent here secondary to having low hemoglobin.  She is 

normally end-stage renal disease on hemodialysis on Monday/Wednesday/Friday and 

says that she did last have it on Monday.  Her nephrologist is Dr. Benitez.  She 

has a past medical history as well of CHF, GERD, HIV, hypertension.  She 

complains of some generalized weakness.  She also has the complaint of some 

mild shortness of breath.  She denies any fever, chest pain, nausea, vomiting.  

There is some mild lower external swelling.  No recent travel or sick contacts 

at home.





- Past Medical History


Hx Hypertension: Yes


Hx Heart Attack/AMI: No


Hx Congestive Heart Failure: Yes


Hx Diabetes: No


Hx GERD: Yes


Hx Liver Disease: Yes (abnormal liver function test, coagulopathy, mass on liver

)


Hx Renal Disease: Yes (M, W, F)


Hx Arthritis: No


Hx Asthma: No


Hx COPD: No


Hx HIV: Yes





- Surgical History


Hx Coronary Stent: No


Hx Open Heart Surgery: No


Hx Pacemaker: No


Hx Internal Defibrillator: No


Hx Cholecystectomy: No


Hx Appendectomy: Yes


Hx Breast Surgery: No


Additional Surgical History: PD catheter -removed.  FISTULA LEFT ARM





- Social History


Smoking Status: Former Smoker





ROS: 


Stated complaint: WEAKNESS


Other details as noted in HPI





Comment: All other systems reviewed and negative


Constitutional: denies: chills, fever


Eyes: denies: eye pain, eye discharge, vision change


ENT: denies: ear pain, throat pain


Respiratory: shortness of breath.  denies: cough


Cardiovascular: edema.  denies: chest pain


Gastrointestinal: denies: nausea, vomiting


Genitourinary: denies: dysuria, discharge


Musculoskeletal: denies: back pain, arthralgia


Skin: denies: rash, lesions


Neurological: denies: headache, numbness











Past History


Past Medical History: ESRD, GERD, heart failure, HIV/AIDS, hypertension


Past Surgical History: appendectomy, Other (colonoscopy, LUE AVF, removal of PD 

catheter)


Social history: , lives with family.  denies: smoking, alcohol abuse, 

prescription drug abuse


Family history: hypertension





Medications and Allergies


 Allergies











Allergy/AdvReac Type Severity Reaction Status Date / Time


 


codeine Allergy  HALLUCINATE Verified 07/05/18 17:25


 


morphine Allergy  hallucinati Verified 07/11/18 00:04





   on  


 


hydralazine AdvReac  Vomiting Verified 07/05/18 17:25











 Home Medications











 Medication  Instructions  Recorded  Confirmed  Last Taken  Type


 


Efavirenz [Sustiva] 600 mg PO DAILY 02/02/17 10/03/18 10/02/18 History


 


Terazosin [Hytrin] 10 mg PO BID PRN 02/02/17 10/03/18 10/02/18 History


 


Tenofovir [Viread] 300 mg PO QWEEK 07/17/17 10/03/18 07/19/18 History


 


Fluticasone [Flonase] 1 spray NS QDAY #1 bottle 06/13/18 10/03/18 Unknown Rx


 


B Complex 11/Folic/C/Biot/Zinc 1 each PO QDAY 07/10/18 10/03/18 10/02/18 History





[Dialyvite with Zinc Tablet]     


 


lamiVUDine [Epivir] 150 mg PO QPM 07/10/18 10/03/18 10/02/18 History


 


Calcium Acetate [Phoslo] 1,334 mg PO TIDWM 07/24/18 10/03/18 10/02/18 History


 


cloNIDine [Catapres] 0.2 mg PO QID 07/24/18 10/03/18 10/03/18 History


 


Pantoprazole [Protonix TAB] 40 mg PO DAILY #30 tablet 07/27/18 10/03/18 10/02/

18 Rx


 


Minoxidil [Loniten] 10 mg PO BID PRN 10/03/18 10/03/18 10/02/18 History











Active Meds: 


Active Medications





Acetaminophen (Tylenol)  650 mg PO Q4H PRN


   PRN Reason: Pain MILD(1-3)/Fever >100.5/HA


Albuterol (Proventil)  2.5 mg IH Q4HRT PRN


   PRN Reason: Shortness Of Breath


Calcium Acetate (Phoslo)  1,334 mg PO TIDWM [facility]


   Last Admin: 10/04/18 08:30 Dose:  1,334 mg


Clonidine HCl (Catapres)  0.2 mg PO QID [facility]


   Last Admin: 10/04/18 09:37 Dose:  0.2 mg


Efavirenz (Sustiva)  600 mg PO QHS [facility]


Fluticasone Propionate (Flonase)  50 mcg NS QDAY [facility]


Hydromorphone HCl (Dilaudid)  0.25 mg IV Q3H PRN


   PRN Reason: Pain , Severe (7-10)


   Last Admin: 10/04/18 09:49 Dose:  0.25 mg


Lamivudine (Epivir)  50 mg PO QPM [facility]


Minoxidil (Loniten)  10 mg PO BID PRN


   PRN Reason: Blood Pressure


Multivit/Ca Carb/B Cmplx/FA/Prenat (Renal Caps)  1 cap PO QDAY [facility]


   Last Admin: 10/04/18 09:37 Dose:  1 cap


Ondansetron HCl (Zofran)  4 mg IV Q8H PRN


   PRN Reason: Nausea And Vomiting


Pantoprazole Sodium (Protonix)  40 mg IV BID [facility]


   Last Admin: 10/04/18 09:38 Dose:  40 mg


Prazosin HCl (Minipress)  5 mg PO Q12HR PRN


   PRN Reason: BLOODPRESSURE


Sodium Chloride (Sodium Chloride Flush Syringe 10 Ml)  10 ml IV BID [facility]


   Last Admin: 10/03/18 22:09 Dose:  10 ml


Sodium Chloride (Sodium Chloride Flush Syringe 10 Ml)  10 ml IV PRN PRN


   PRN Reason: LINE FLUSH


Tenofovir Disoproxil Fumarate (Viread)  300 mg PO Th [facility]


   Last Admin: 10/04/18 09:43 Dose:  300 mg











Exam





- Vital Signs


Vital signs: 


 Vital Signs











Temp Pulse Resp BP Pulse Ox


 


 98.7 F   85   20   128/68   100 


 


 10/03/18 13:41  10/03/18 13:41  10/03/18 13:41  10/03/18 13:41  10/03/18 13:41














- Physical Exam


Narrative exam: 





GENERAL: The patient is well-developed well-nourished.


HENT: Normocephalic.  Atraumatic.    Patient has moist mucous membranes.


EYES: Extraocular motions are intact.  Pupils equal reactive to light 

bilaterally.


NECK: Supple.  Trachea is midline.


CHEST/LUNGS: Mild coarse breath sounds.  No tachypnea or accessory muscle use.  

There is no respiratory distress noted.


HEART/CARDIOVASCULAR: Regular.  There is no tachycardia.  There is no murmur.


ABDOMEN: Abdomen is soft. Patient has normal bowel sounds.  There is no 

abdominal distention.


SKIN: Skin is warm and dry.  Mild lower extremity swelling.


NEURO: The patient is awake, alert, and oriented.  The patient is cooperative.  

The patient has no focal neurologic deficits.  The patient has normal speech.


MUSCULOSKELETAL: There is no tenderness or deformity.  There is no evidence of 

acute injury.








Results





- Lab Results





 10/04/18 06:41





 10/03/18 13:51


 Most recent lab results











Calcium  8.0 mg/dL (8.4-10.2)  L  10/03/18  13:51    














Assessment and Plan





Impression


* End-stage renal disease


* Symptomatic anemia of abl poa


* gi bleeding


* Hypertension


* HIV disease





Recommendations


* prn packed RBC transfusion--rec 1 unit today with hd


* Continue dialysis on today, then MWF schedule as outpatient


* No IV, BP or of any puncture in access arm


* no heparin with hd, epogen with hd


* Further plans as per  GI services 


* Hold PO4 binders for now 


* adjust diet and meds for  ESRD state

## 2018-10-04 NOTE — PROGRESS NOTE
Assessment and Plan


Assessment and plan: 


67 YO Female with HTN, Diastolic CHF, GERD, ESRD on HD (M,W,F), HIV Disease, 

Diverticulosis presents to  ED for evaluation with a prior admission of Rectal 

bleeding which was heavy a the time but did not require transfusion and 

stopped. Had undergone a colonosocopy with polypectomy and no clear 

identification of the bleeding source, with recommendation to follow with GI 

outpatient presents via EMS after she was noted during routine dialysis to  

have a hgb of 4. She also reported generalized weakness, shortness of breath, 

and decreased exercise tolerance over the past 2 weeks. Pt seen and evaluated 

in ED and found to have ESRD complicated by Symptomatic anemia. Nephrology 

consulted in ED. GI consulted in ED. Patient denies fever, chills, CP, 

Palpitations, NVD, Trauma, unintentional weight loss, night sweats, BRBPR, 

productive cough, or recent ill contacts.  








Symptomatic Anemia


Severe Blood loss anemia with GI source


ESRD


Hypertensive Urgency- Resolved


Tachycardia


Acute on chronic Diastolic Congestive Heart failure


Moderate to severe protein calorie Malnutrition


Secondary Hypercoagluable state


HIV


HTN








PLAN


- Continue supportive care


- GI evaluation pending


- May need cardiology eval outpatient for recurrent tachycardia if not 

resolving following transfusion.


- Start low dose Atenolol


- Continue IV PPI BID 


- Continue ANTIRETROVIRAL AND Outpatient ID f/u care on discharge


- Nephrology for HD 


- Low salt diet. Strict I/O, daily weight


- Outpatient Hematology eval for secondary hypergcaogluable state


- DVT/GI prophy








History


Interval history: 


Patient seen and examined in no acute distress. Resting comfortably. Reports 

occasional palpitation with no associated chest pain or dizziness. 








Hospitalist Physical





- Physical exam


Narrative exam: 


 VITAL SIGNS:  Reviewed.    


GENERAL:  The patient appeared well nourished and normally developed. Vital 

signs as documented.


HEAD:  No signs of head trauma.


EYES:  Pupils are equal.  Extraocular motions intact.  


EARS:  Hearing grossly intact.


MOUTH:  Oropharynx is normal. 


NECK:  No adenopathy, no JVD.   


CHEST:  Chest with clear breath sounds bilaterally.  No wheezes, rales, or 

rhonchi.  


CARDIAC:  Tachycardia but Regular rhythm.  S1 and S2, without murmurs, gallops, 

or rubs.


VASCULAR:  No Edema.  Peripheral pulses normal and equal in all extremities.


ABDOMEN:  Soft, without detectable tenderness.  No sign of distention.  No   

rebound or guarding, and no masses palpated.   Bowel Sounds normal.


MUSCULOSKELETAL:  Good range of motion of all major joints. Extremities without 

clubbing, cyanosis or edema.  


NEUROLOGIC EXAM:  Alert and oriented x 3.  No focal sensory or strength 

deficits.   Speech normal.  Follows commands.


PSYCHIATRIC:  Mood normal.


SKIN: Left arm AVF.

















- Constitutional


Vitals: 


 











Temp Pulse Resp BP Pulse Ox


 


 99.1 F   91 H  20   144/56   100 


 


 10/04/18 03:23  10/04/18 03:23  10/04/18 03:23  10/04/18 03:23  10/04/18 03:23











General appearance: Present: mild distress





Results





- Labs


CBC & Chem 7: 


 10/05/18 02:39





 10/05/18 02:39


Labs: 


 Laboratory Last Values











WBC  5.6 K/mm3 (4.5-11.0)   10/03/18  13:51    


 


RBC  1.49 M/mm3 (3.65-5.03)  L  10/03/18  13:51    


 


Hgb  7.6 gm/dl (10.1-14.3)  L  10/04/18  06:41    


 


Hct  23.6 % (30.3-42.9)  L  10/04/18  06:41    


 


MCV  96 fl (79-97)   10/03/18  13:51    


 


MCH  30 pg (28-32)   10/03/18  13:51    


 


MCHC  31 % (30-34)   10/03/18  13:51    


 


RDW  21.1 % (13.2-15.2)  H  10/03/18  13:51    


 


Plt Count  136 K/mm3 (140-440)  L  10/03/18  13:51    


 


Lymph % (Auto)  17.8 % (13.4-35.0)   10/03/18  13:51    


 


Mono % (Auto)  5.4 % (0.0-7.3)   10/03/18  13:51    


 


Eos % (Auto)  0.7 % (0.0-4.3)   10/03/18  13:51    


 


Baso % (Auto)  2.2 % (0.0-1.8)  H  10/03/18  13:51    


 


Lymph #  1.0 K/mm3 (1.2-5.4)  L  10/03/18  13:51    


 


Mono #  0.3 K/mm3 (0.0-0.8)   10/03/18  13:51    


 


Eos #  0.0 K/mm3 (0.0-0.4)   10/03/18  13:51    


 


Baso #  0.1 K/mm3 (0.0-0.1)   10/03/18  13:51    


 


Seg Neutrophils %  73.9 % (40.0-70.0)  H  10/03/18  13:51    


 


Seg Neutrophils #  4.1 K/mm3 (1.8-7.7)   10/03/18  13:51    


 


PT  17.9 Sec. (12.2-14.9)  H  10/03/18  13:51    


 


INR  1.39  (0.87-1.13)  H  10/03/18  13:51    


 


APTT  34.6 Sec. (24.2-36.6)   10/03/18  13:51    


 


Sodium  139 mmol/L (137-145)   10/03/18  13:51    


 


Potassium  3.5 mmol/L (3.6-5.0)  L  10/03/18  13:51    


 


Chloride  96.7 mmol/L ()  L  10/03/18  13:51    


 


Carbon Dioxide  30 mmol/L (22-30)   10/03/18  13:51    


 


Anion Gap  16 mmol/L  10/03/18  13:51    


 


BUN  25 mg/dL (7-17)  H  10/03/18  13:51    


 


Creatinine  5.6 mg/dL (0.7-1.2)  H  10/03/18  13:51    


 


Estimated GFR  9 ml/min  10/03/18  13:51    


 


BUN/Creatinine Ratio  4 %  10/03/18  13:51    


 


Glucose  92 mg/dL ()   10/03/18  13:51    


 


Calcium  8.0 mg/dL (8.4-10.2)  L  10/03/18  13:51    


 


Total Bilirubin  0.20 mg/dL (0.1-1.2)   10/03/18  13:51    


 


AST  23 units/L (5-40)   10/03/18  13:51    


 


ALT  7 units/L (7-56)   10/03/18  13:51    


 


Alkaline Phosphatase  92 units/L ()   10/03/18  13:51    


 


Total Protein  5.3 g/dL (6.3-8.2)  L  10/03/18  13:51    


 


Albumin  2.5 g/dL (3.9-5)  L  10/03/18  13:51    


 


Albumin/Globulin Ratio  0.9 %  10/03/18  13:51    


 


Lipase  22 units/L (13-60)   10/03/18  13:51    


 


TSH  3.890 mlU/mL (0.270-4.200)   10/03/18  14:04    


 


Blood Type  B POSITIVE   10/03/18  13:51    


 


Antibody Screen  Negative   10/03/18  13:51    


 


Crossmatch  See Detail   10/03/18  13:51    














- Imaging and Cardiology


Chest x-ray: image reviewed (new small focus patchy opacity right lower lobe)

## 2018-10-05 VITALS — SYSTOLIC BLOOD PRESSURE: 190 MMHG | DIASTOLIC BLOOD PRESSURE: 84 MMHG

## 2018-10-05 LAB
BUN SERPL-MCNC: 13 MG/DL (ref 7–17)
BUN/CREAT SERPL: 4 %
CALCIUM SERPL-MCNC: 8.2 MG/DL (ref 8.4–10.2)
HCT VFR BLD CALC: 31.7 % (ref 30.3–42.9)
HEMOLYSIS INDEX: 100
HGB BLD-MCNC: 10.5 GM/DL (ref 10.1–14.3)
MCH RBC QN AUTO: 30 PG (ref 28–32)
MCHC RBC AUTO-ENTMCNC: 33 % (ref 30–34)
MCV RBC AUTO: 89 FL (ref 79–97)
PLATELET # BLD: 124 K/MM3 (ref 140–440)
RBC # BLD AUTO: 3.56 M/MM3 (ref 3.65–5.03)

## 2018-10-05 PROCEDURE — 5A1D70Z PERFORMANCE OF URINARY FILTRATION, INTERMITTENT, LESS THAN 6 HOURS PER DAY: ICD-10-PCS | Performed by: INTERNAL MEDICINE

## 2018-10-05 RX ADMIN — CARVEDILOL SCH MG: 6.25 TABLET, FILM COATED ORAL at 16:47

## 2018-10-05 RX ADMIN — PANTOPRAZOLE SODIUM SCH MG: 40 TABLET, DELAYED RELEASE ORAL at 16:18

## 2018-10-05 RX ADMIN — HYDROMORPHONE HYDROCHLORIDE PRN MG: 1 INJECTION, SOLUTION INTRAMUSCULAR; INTRAVENOUS; SUBCUTANEOUS at 00:12

## 2018-10-05 RX ADMIN — CLONIDINE HYDROCHLORIDE SCH MG: 0.2 TABLET ORAL at 00:27

## 2018-10-05 RX ADMIN — CALCIUM ACETATE SCH: 667 CAPSULE ORAL at 08:34

## 2018-10-05 RX ADMIN — NEPHROCAP SCH: 1 CAP ORAL at 09:36

## 2018-10-05 RX ADMIN — CALCIUM ACETATE SCH: 667 CAPSULE ORAL at 11:55

## 2018-10-05 RX ADMIN — FLUTICASONE PROPIONATE SCH: 50 SPRAY, METERED NASAL at 09:35

## 2018-10-05 RX ADMIN — CARVEDILOL SCH: 6.25 TABLET, FILM COATED ORAL at 09:35

## 2018-10-05 RX ADMIN — Medication SCH: at 09:36

## 2018-10-05 RX ADMIN — HYDROMORPHONE HYDROCHLORIDE PRN MG: 1 INJECTION, SOLUTION INTRAMUSCULAR; INTRAVENOUS; SUBCUTANEOUS at 11:57

## 2018-10-05 RX ADMIN — PANTOPRAZOLE SODIUM SCH: 40 TABLET, DELAYED RELEASE ORAL at 09:35

## 2018-10-05 NOTE — DISCHARGE SUMMARY
Providers





- Providers


Date of Admission: 


10/03/18 15:16





Attending physician: 


DUSTIN GRAHAM MD





 





10/03/18 15:17


Consult to Physician [CONS] Routine 


   Comment: TIANNA HAIR NOTIFIED [id number]


   Consulting Provider: NÉSTOR MORENO


   Physician Instructions: 


   Reason For Exam: esrd





10/03/18 17:07


Consult to Physician [CONS] Routine 


   Comment: called answ. serv./susan


   Consulting Provider: DA BETTS


   Physician Instructions: 


   Reason For Exam: GI Bleed/Anemia











Primary care physician: 


PRIMARY CARE MD








Hospitalization


Reason for admission: symptomatic anemia


Condition: Fair


Hospital course: 


67 YO Female with HTN, Diastolic CHF, GERD, ESRD on HD (M,W,F), HIV Disease, 

Diverticulosis presents to  ED for evaluation with a prior admission of Rectal 

bleeding which was heavy a the time but did not require transfusion and 

stopped. Had undergone a colonosocopy with polypectomy and no clear 

identification of the bleeding source, with recommendation to follow with GI 

outpatient presents via EMS after she was noted during routine dialysis to  

have a hgb of 4. She also reported generalized weakness, shortness of breath, 

and decreased exercise tolerance over the past 2 weeks. Pt seen and evaluated 

in ED and found to have ESRD complicated by Symptomatic anemia. Nephrology 

consulted in ED. GI consulted in ED. Patient denies fever, chills, CP, 

Palpitations, NVD, Trauma, unintentional weight loss, night sweats, BRBPR, 

productive cough, or recent ill contacts.  Patient recieved 4 units of PRBC 

with improvement in hemoglobin. She remained tachycardia and was started on low 

dose beta blocker with recommendation for outpatient cardiology to ensure 

resolution, as the equilibration and also missed dialysis which led to vascular 

congestion could be part of the etiology. 


She was seen by GI with concern for diverticular bleed and recommended to have 

a scan next time there is a bleed. 


she had dialysis with 500cc only removed. 








Discharge Diagnosis





Symptomatic Anemia 


Severe Blood loss anemia with Diverticular bleed


Diverticular bleed


ESRD


Hypertensive Urgency- Resolved


Tachycardia


Acute on chronic Diastolic Congestive Heart failure


Moderate to severe protein calorie Malnutrition


Secondary Hypercoagluable state


HIV


HTN











Disposition: DC-01 TO HOME OR SELFCARE


Time spent for discharge: 35 mins





Core Measure Documentation





- Palliative Care


Palliative Care/ Comfort Measures: Not Applicable





- Core Measures


Any of the following diagnoses?: none





Exam





- Physical Exam


Narrative exam: 


 VITAL SIGNS:  Reviewed.    


GENERAL:  The patient appeared well nourished and normally developed. Vital 

signs as documented.


HEAD:  No signs of head trauma.


EYES:  Pupils are equal.  Extraocular motions intact.  


EARS:  Hearing grossly intact.


MOUTH:  Oropharynx is normal. 


NECK:  No adenopathy, no JVD.   


CHEST:  Chest with clear breath sounds bilaterally.  No wheezes, rales, or 

rhonchi.  


CARDIAC:  Regular rhythm.  S1 and S2, without murmurs, gallops, or rubs.


VASCULAR:  No Edema.  Peripheral pulses normal and equal in all extremities.


ABDOMEN:  Soft, without detectable tenderness.  No sign of distention.  No   

rebound or guarding, and no masses palpated.   Bowel Sounds normal.


MUSCULOSKELETAL:  Good range of motion of all major joints. Extremities without 

clubbing, cyanosis or edema.  


NEUROLOGIC EXAM:  Alert and oriented x 3.  No focal sensory or strength 

deficits.   Speech normal.  Follows commands.


PSYCHIATRIC:  Mood normal.


SKIN: Left arm AVF.

















- Constitutional


Vitals: 


 











Temp Pulse Resp BP Pulse Ox


 


 98.7 F   121 H  20   143/67   100 


 


 10/05/18 02:25  10/05/18 02:27  10/05/18 02:27  10/05/18 02:27  10/05/18 02:27














Plan


Activity: advance as tolerated, fall precautions


Diet: diabetic, renal


Special Instructions: record daily BP diary, record blood sugar diary


Follow up with: 


PRIMARY CARE,MD [Primary Care Provider] - 3-5 Days


SHIRIN AYOUB MD [Staff Physician] - 7 Days


THO HUGHES MD [Staff Physician] - 7 Days


Prescriptions: 


Carvedilol [Coreg] 3.125 mg PO BID #60 tablet

## 2018-10-05 NOTE — PROGRESS NOTE
Assessment and Plan





Impression


* End-stage renal disease


* Symptomatic anemia of abl poa


* gi bleeding


* Hypertension


* HIV disease





Recommendations


* prn packed RBC transfusion


* Continue dialysis MWF schedule as outpatient


* No IV, BP or of any puncture in access arm


* no heparin with hd, epogen with hd


* Further plans as per  GI services 


* Hold PO4 binders for now 


* adjust diet and meds for  ESRD state


* ok to dc after HD today





Subjective


Date of service: 10/05/18


Principal diagnosis: anemia, rectal bleeding


Interval history: 





resting in bed today





Objective





- Exam


Narrative Exam: 





GENERAL: The patient is well-developed well-nourished.


HENT: Normocephalic.  Atraumatic.    Patient has moist mucous membranes.


EYES: Extraocular motions are intact.  Pupils equal reactive to light 

bilaterally.


NECK: Supple.  Trachea is midline.


CHEST/LUNGS: Mild coarse breath sounds.  No tachypnea or accessory muscle use.  

There is no respiratory distress noted.


HEART/CARDIOVASCULAR: Regular.  There is no tachycardia.  There is no murmur.


ABDOMEN: Abdomen is soft. Patient has normal bowel sounds.  There is no 

abdominal distention.


SKIN: Skin is warm and dry.  Mild lower extremity swelling.


NEURO: The patient is awake, alert, and oriented.  The patient is cooperative.  

The patient has no focal neurologic deficits.  The patient has normal speech.


MUSCULOSKELETAL: There is no tenderness or deformity.  There is no evidence of 

acute injury.








- Vital Signs


Vital signs: 


 Vital Signs - 12hr











  10/05/18 10/05/18 10/05/18





  00:12 00:19 00:27


 


Temperature  98.7 F 


 


Pulse Rate  119 H 113 H


 


Respiratory 20 20 





Rate   


 


Blood Pressure  209/96 209/96


 


O2 Sat by Pulse  100 





Oximetry   














  10/05/18 10/05/18 10/05/18





  00:41 00:42 00:57


 


Temperature 98.6 F  


 


Pulse Rate 110 H  106 H


 


Respiratory 20 20 





Rate   


 


Blood Pressure 180/83  


 


O2 Sat by Pulse 100  





Oximetry   














  10/05/18 10/05/18 10/05/18





  01:04 01:34 01:45


 


Temperature 98.6 F 98.7 F 


 


Pulse Rate 110 H 100 H 115 H


 


Respiratory 20 20 20





Rate   


 


Blood Pressure 176/86 123/62 138/60


 


O2 Sat by Pulse 100 99 100





Oximetry   














  10/05/18 10/05/18 10/05/18





  02:07 02:25 02:27


 


Temperature 97.6 F 98.7 F 


 


Pulse Rate 124 H  121 H


 


Respiratory 18  20





Rate   


 


Blood Pressure 129/67  143/67


 


O2 Sat by Pulse 100  100





Oximetry   














  10/05/18





  08:00


 


Temperature 98.6 F


 


Pulse Rate 125 H


 


Respiratory 18





Rate 


 


Blood Pressure 146/75


 


O2 Sat by Pulse 99





Oximetry 














- Lab





 10/05/18 02:39





 10/05/18 02:39


 Most recent lab results











Calcium  8.2 mg/dL (8.4-10.2)  L  10/05/18  02:39